# Patient Record
Sex: FEMALE | Race: BLACK OR AFRICAN AMERICAN | NOT HISPANIC OR LATINO | Employment: FULL TIME | URBAN - METROPOLITAN AREA
[De-identification: names, ages, dates, MRNs, and addresses within clinical notes are randomized per-mention and may not be internally consistent; named-entity substitution may affect disease eponyms.]

---

## 2017-01-04 ENCOUNTER — TELEPHONE (OUTPATIENT)
Dept: RADIOLOGY | Facility: HOSPITAL | Age: 27
End: 2017-01-04

## 2017-01-12 ENCOUNTER — TELEPHONE (OUTPATIENT)
Dept: RADIOLOGY | Facility: HOSPITAL | Age: 27
End: 2017-01-12

## 2017-08-29 ENCOUNTER — HOSPITAL ENCOUNTER (EMERGENCY)
Facility: HOSPITAL | Age: 27
Discharge: HOME | End: 2017-08-29
Attending: EMERGENCY MEDICINE
Payer: COMMERCIAL

## 2017-08-29 VITALS
BODY MASS INDEX: 25.51 KG/M2 | RESPIRATION RATE: 18 BRPM | WEIGHT: 144 LBS | DIASTOLIC BLOOD PRESSURE: 69 MMHG | OXYGEN SATURATION: 100 % | SYSTOLIC BLOOD PRESSURE: 118 MMHG | TEMPERATURE: 99 F | HEART RATE: 76 BPM

## 2017-08-29 DIAGNOSIS — K08.89 PAIN, DENTAL: Primary | ICD-10-CM

## 2017-08-29 PROCEDURE — 99283 EMERGENCY DEPT VISIT LOW MDM: CPT

## 2017-08-29 PROCEDURE — 25000003 PHARM REV CODE 250: Performed by: EMERGENCY MEDICINE

## 2017-08-29 RX ORDER — FOLIC ACID 1 MG/1
1 TABLET ORAL DAILY
COMMUNITY
End: 2019-06-19

## 2017-08-29 RX ORDER — KETOROLAC TROMETHAMINE 10 MG/1
10 TABLET, FILM COATED ORAL EVERY 6 HOURS
Qty: 20 TABLET | Refills: 0 | Status: SHIPPED | OUTPATIENT
Start: 2017-08-29 | End: 2019-06-19

## 2017-08-29 RX ORDER — KETOROLAC TROMETHAMINE 10 MG/1
10 TABLET, FILM COATED ORAL
Status: COMPLETED | OUTPATIENT
Start: 2017-08-29 | End: 2017-08-29

## 2017-08-29 RX ADMIN — KETOROLAC TROMETHAMINE 10 MG: 10 TABLET, FILM COATED ORAL at 11:08

## 2017-08-30 RX ORDER — NALOXONE HYDROCHLORIDE 1 MG/ML
INJECTION INTRAMUSCULAR; INTRAVENOUS; SUBCUTANEOUS
Status: DISCONTINUED
Start: 2017-08-30 | End: 2017-08-30 | Stop reason: WASHOUT

## 2017-08-30 NOTE — ED PROVIDER NOTES
Encounter Date: 2017       History     Chief Complaint   Patient presents with    Dental Pain     reports left sided jaw/dental pain s/p filling today; minimal relief with tylenol otc     Patient presents with a chief complaint of dental pain.  Onset reported as this afternoon following a dental procedure (cavity drilled and filled).  This is localized to the upper LEFT jaw.  Patient denies trismus.  Patient denies fever and swelling.              Review of patient's allergies indicates:   Allergen Reactions    Aspirin Rash    Penicillins Rash     History reviewed. No pertinent past medical history.  Past Surgical History:   Procedure Laterality Date     SECTION       History reviewed. No pertinent family history.  Social History   Substance Use Topics    Smoking status: Never Smoker    Smokeless tobacco: Never Used    Alcohol use No     Review of Systems   Constitutional: Negative for chills and fever.   HENT: Positive for dental problem. Negative for congestion and rhinorrhea.    Respiratory: Negative for cough, chest tightness, shortness of breath and wheezing.    Cardiovascular: Negative for chest pain, palpitations and leg swelling.   Gastrointestinal: Negative for abdominal pain, constipation, diarrhea, nausea and vomiting.   Genitourinary: Negative for dysuria, frequency, urgency, vaginal bleeding and vaginal discharge.   Skin: Negative for color change and rash.   Allergic/Immunologic: Negative for immunocompromised state.   Neurological: Negative for dizziness, weakness and numbness.   Hematological: Negative for adenopathy. Does not bruise/bleed easily.   All other systems reviewed and are negative.      Physical Exam     Initial Vitals [17 2246]   BP Pulse Resp Temp SpO2   118/69 76 18 99.4 °F (37.4 °C) 100 %      MAP       85.33         Physical Exam    Nursing note and vitals reviewed.  Constitutional: She appears well-developed and well-nourished. She is not diaphoretic. No  distress.   HENT:   Head: Normocephalic and atraumatic.   Mouth/Throat: Uvula is midline, oropharynx is clear and moist and mucous membranes are normal.       Cardiovascular: Normal rate, regular rhythm, normal heart sounds and intact distal pulses.   Pulmonary/Chest: Breath sounds normal. No respiratory distress.   Neurological: She is alert and oriented to person, place, and time.   Skin: Skin is warm and dry.         ED Course   Procedures  Labs Reviewed - No data to display          Medical Decision Making:   ED Management:  All findings were reviewed with the patient/family in detail.  All remaining questions and concerns were addressed at that time.  Patient has been counseled regarding the need for follow-up as well as the indication to return to the emergency room should new or worrisome developments occur.  Ollie Smith MD  12:53 AM                     ED Course     Clinical Impression:   The encounter diagnosis was Pain, dental.                           Ollie Smith MD  08/30/17 0053

## 2018-11-18 ENCOUNTER — HOSPITAL ENCOUNTER (EMERGENCY)
Facility: HOSPITAL | Age: 28
Discharge: HOME | End: 2018-11-18
Attending: EMERGENCY MEDICINE
Payer: COMMERCIAL

## 2018-11-18 VITALS
SYSTOLIC BLOOD PRESSURE: 100 MMHG | OXYGEN SATURATION: 99 % | DIASTOLIC BLOOD PRESSURE: 58 MMHG | TEMPERATURE: 99 F | HEIGHT: 63 IN | WEIGHT: 154.31 LBS | BODY MASS INDEX: 27.34 KG/M2 | RESPIRATION RATE: 20 BRPM | HEART RATE: 85 BPM

## 2018-11-18 DIAGNOSIS — R10.9 FLANK PAIN: Primary | ICD-10-CM

## 2018-11-18 LAB
ALBUMIN SERPL BCP-MCNC: 3.5 G/DL
ALP SERPL-CCNC: 56 U/L
ALT SERPL W/O P-5'-P-CCNC: 29 U/L
ANION GAP SERPL CALC-SCNC: 9 MMOL/L
AST SERPL-CCNC: 18 U/L
B-HCG UR QL: NEGATIVE
BASOPHILS # BLD AUTO: 0.04 K/UL
BASOPHILS NFR BLD: 0.5 %
BILIRUB SERPL-MCNC: 0.4 MG/DL
BILIRUB UR QL STRIP: NEGATIVE
BUN SERPL-MCNC: 14 MG/DL
CALCIUM SERPL-MCNC: 8.8 MG/DL
CHLORIDE SERPL-SCNC: 108 MMOL/L
CLARITY UR REFRACT.AUTO: CLEAR
CO2 SERPL-SCNC: 22 MMOL/L
COLOR UR AUTO: YELLOW
CREAT SERPL-MCNC: 0.7 MG/DL
DIFFERENTIAL METHOD: NORMAL
EOSINOPHIL # BLD AUTO: 0.1 K/UL
EOSINOPHIL NFR BLD: 1.8 %
ERYTHROCYTE [DISTWIDTH] IN BLOOD BY AUTOMATED COUNT: 13.9 %
EST. GFR  (AFRICAN AMERICAN): >60 ML/MIN/1.73 M^2
EST. GFR  (NON AFRICAN AMERICAN): >60 ML/MIN/1.73 M^2
GLUCOSE SERPL-MCNC: 82 MG/DL
GLUCOSE UR QL STRIP: NEGATIVE
HCT VFR BLD AUTO: 38.3 %
HGB BLD-MCNC: 12.6 G/DL
HGB UR QL STRIP: NEGATIVE
KETONES UR QL STRIP: NEGATIVE
LEUKOCYTE ESTERASE UR QL STRIP: NEGATIVE
LYMPHOCYTES # BLD AUTO: 2.4 K/UL
LYMPHOCYTES NFR BLD: 31 %
MCH RBC QN AUTO: 30.1 PG
MCHC RBC AUTO-ENTMCNC: 32.9 G/DL
MCV RBC AUTO: 91 FL
MONOCYTES # BLD AUTO: 0.6 K/UL
MONOCYTES NFR BLD: 7.2 %
NEUTROPHILS # BLD AUTO: 4.5 K/UL
NEUTROPHILS NFR BLD: 59.4 %
NITRITE UR QL STRIP: NEGATIVE
PH UR STRIP: 7 [PH] (ref 5–8)
PLATELET # BLD AUTO: 201 K/UL
PMV BLD AUTO: 12.4 FL
POTASSIUM SERPL-SCNC: 3.8 MMOL/L
PROT SERPL-MCNC: 7 G/DL
PROT UR QL STRIP: NEGATIVE
RBC # BLD AUTO: 4.19 M/UL
SODIUM SERPL-SCNC: 139 MMOL/L
SP GR UR STRIP: 1.01 (ref 1–1.03)
URN SPEC COLLECT METH UR: NORMAL
UROBILINOGEN UR STRIP-ACNC: NEGATIVE EU/DL
WBC # BLD AUTO: 7.61 K/UL

## 2018-11-18 PROCEDURE — 81003 URINALYSIS AUTO W/O SCOPE: CPT

## 2018-11-18 PROCEDURE — 80053 COMPREHEN METABOLIC PANEL: CPT

## 2018-11-18 PROCEDURE — 85025 COMPLETE CBC W/AUTO DIFF WBC: CPT

## 2018-11-18 PROCEDURE — 81025 URINE PREGNANCY TEST: CPT

## 2018-11-18 PROCEDURE — 99284 EMERGENCY DEPT VISIT MOD MDM: CPT | Mod: 25

## 2018-11-18 PROCEDURE — 36000 PLACE NEEDLE IN VEIN: CPT

## 2018-11-18 NOTE — ED PROVIDER NOTES
Encounter Date: 2018       History     Chief Complaint   Patient presents with    Abdominal Pain     right lower quad abd pain since fri but at approx. 2am constant pain.      The history is provided by the patient.   Abdominal Pain   The current episode started two days ago. The onset of the illness was gradual. The problem has not changed since onset.The abdominal pain is located in the right flank. The abdominal pain does not radiate. The abdominal pain is relieved by nothing. The other symptoms of the illness do not include fever, fatigue, shortness of breath, nausea, vomiting, dysuria, vaginal discharge or vaginal bleeding.   Symptoms associated with the illness do not include back pain.     Review of patient's allergies indicates:   Allergen Reactions    Ibuprofen      Itching    Aspirin Rash    Penicillins Rash     Past Medical History:   Diagnosis Date    Ovarian cyst      Past Surgical History:   Procedure Laterality Date     SECTION       History reviewed. No pertinent family history.  Social History     Tobacco Use    Smoking status: Never Smoker    Smokeless tobacco: Never Used   Substance Use Topics    Alcohol use: No    Drug use: No     Review of Systems   Constitutional: Negative for fatigue and fever.   HENT: Negative for sore throat.    Respiratory: Negative for shortness of breath.    Cardiovascular: Negative for chest pain.   Gastrointestinal: Positive for abdominal pain. Negative for nausea and vomiting.   Genitourinary: Negative for dysuria, vaginal bleeding and vaginal discharge.   Musculoskeletal: Negative for back pain.   Skin: Negative for rash.   Neurological: Negative for weakness.   Hematological: Does not bruise/bleed easily.       Physical Exam     Initial Vitals [18 1001]   BP Pulse Resp Temp SpO2   106/72 84 20 98.9 °F (37.2 °C) 99 %      MAP       --         Physical Exam    Nursing note and vitals reviewed.  Constitutional: She appears well-developed  and well-nourished. No distress.   HENT:   Head: Normocephalic and atraumatic.   Mouth/Throat: Oropharynx is clear and moist.   Eyes: Conjunctivae and EOM are normal. Pupils are equal, round, and reactive to light.   Neck: Normal range of motion. Neck supple.   Cardiovascular: Normal rate, regular rhythm and normal heart sounds. Exam reveals no gallop and no friction rub.    No murmur heard.  Pulmonary/Chest: Breath sounds normal. No respiratory distress. She has no wheezes. She has no rhonchi. She has no rales.   Abdominal: Soft. Bowel sounds are normal. She exhibits no distension and no mass. There is no tenderness. There is no rebound and no guarding.   Musculoskeletal: Normal range of motion. She exhibits no edema or tenderness.   Neurological: She is alert and oriented to person, place, and time. She has normal strength.   Skin: Skin is warm and dry. No rash noted.   Psychiatric: She has a normal mood and affect. Thought content normal.         ED Course   Procedures  Labs Reviewed   COMPREHENSIVE METABOLIC PANEL - Abnormal; Notable for the following components:       Result Value    CO2 22 (*)     All other components within normal limits   CBC W/ AUTO DIFFERENTIAL   URINALYSIS, REFLEX TO URINE CULTURE    Narrative:     Preferred Collection Type->Urine, Clean Catch   PREGNANCY TEST, URINE RAPID          Imaging Results          CT Renal Stone Study ABD Pelvis WO (Final result)  Result time 11/18/18 10:48:09    Final result by Jp Corona MD (11/18/18 10:48:09)                 Impression:      Normal unenhanced CT of the abdomen and pelvis.    All CT scans at this facility are performed  using dose modulation techniques as appropriate to performed exam including the following:  automated exposure control; adjustment of mA and/or kV according to the patients size (this includes techniques or standardized protocols for targeted exams where dose is matched to indication/reason for exam: i.e. extremities or  "head);  iterative reconstruction technique.      Electronically signed by: Jp Corona MD  Date:    11/18/2018  Time:    10:48             Narrative:    EXAMINATION:  CT RENAL STONE STUDY ABD PELVIS WO    CLINICAL HISTORY:  Flank pain, stone disease suspected;    TECHNIQUE:  Low dose axial images, sagittal and coronal reformations were obtained from the lung bases to the pubic symphysis.  Contrast was not administered.    COMPARISON:  None    FINDINGS:  The lung bases are clear.  The osseous structures are unremarkable.    There is no urolithiasis or hydronephrosis.  Normal appendix.  There is no free fluid or free air or lymphadenopathy or inflammatory change.  The urinary bladder, uterus, and adnexa have a normal unenhanced appearance.  The liver, spleen, pancreas, kidneys, and adrenal glands have a normal unenhanced appearance.  No bowel obstruction.                                     ED Vital Signs:  Vitals:    11/18/18 1001   BP: 106/72   Pulse: 84   Resp: 20   Temp: 98.9 °F (37.2 °C)   TempSrc: Oral   SpO2: 99%   Weight: 70 kg (154 lb 5.2 oz)   Height: 5' 3" (1.6 m)         Abnormal Lab Results:  Labs Reviewed   COMPREHENSIVE METABOLIC PANEL - Abnormal; Notable for the following components:       Result Value    CO2 22 (*)     All other components within normal limits   CBC W/ AUTO DIFFERENTIAL   URINALYSIS, REFLEX TO URINE CULTURE    Narrative:     Preferred Collection Type->Urine, Clean Catch   PREGNANCY TEST, URINE RAPID          All Lab Results:  Results for orders placed or performed during the hospital encounter of 11/18/18   CBC auto differential   Result Value Ref Range    WBC 7.61 3.90 - 12.70 K/uL    RBC 4.19 4.00 - 5.40 M/uL    Hemoglobin 12.6 12.0 - 16.0 g/dL    Hematocrit 38.3 37.0 - 48.5 %    MCV 91 82 - 98 fL    MCH 30.1 27.0 - 31.0 pg    MCHC 32.9 32.0 - 36.0 g/dL    RDW 13.9 11.5 - 14.5 %    Platelets 201 150 - 350 K/uL    MPV 12.4 9.2 - 12.9 fL    Gran # (ANC) 4.5 1.8 - 7.7 K/uL    Lymph # " 2.4 1.0 - 4.8 K/uL    Mono # 0.6 0.3 - 1.0 K/uL    Eos # 0.1 0.0 - 0.5 K/uL    Baso # 0.04 0.00 - 0.20 K/uL    Gran% 59.4 38.0 - 73.0 %    Lymph% 31.0 18.0 - 48.0 %    Mono% 7.2 4.0 - 15.0 %    Eosinophil% 1.8 0.0 - 8.0 %    Basophil% 0.5 0.0 - 1.9 %    Differential Method Automated    Comprehensive metabolic panel   Result Value Ref Range    Sodium 139 136 - 145 mmol/L    Potassium 3.8 3.5 - 5.1 mmol/L    Chloride 108 95 - 110 mmol/L    CO2 22 (L) 23 - 29 mmol/L    Glucose 82 70 - 110 mg/dL    BUN, Bld 14 6 - 20 mg/dL    Creatinine 0.7 0.5 - 1.4 mg/dL    Calcium 8.8 8.7 - 10.5 mg/dL    Total Protein 7.0 6.0 - 8.4 g/dL    Albumin 3.5 3.5 - 5.2 g/dL    Total Bilirubin 0.4 0.1 - 1.0 mg/dL    Alkaline Phosphatase 56 55 - 135 U/L    AST 18 10 - 40 U/L    ALT 29 10 - 44 U/L    Anion Gap 9 8 - 16 mmol/L    eGFR if African American >60.0 >60 mL/min/1.73 m^2    eGFR if non African American >60.0 >60 mL/min/1.73 m^2   Urinalysis, Reflex to Urine Culture Urine, Clean Catch   Result Value Ref Range    Specimen UA Urine, Clean Catch     Color, UA Yellow Yellow, Straw, [place]    Appearance, UA Clear Clear    pH, UA 7.0 5.0 - 8.0    Specific Gravity, UA 1.015 1.005 - 1.030    Protein, UA Negative Negative    Glucose, UA Negative Negative    Ketones, UA Negative Negative    Bilirubin (UA) Negative Negative    Occult Blood UA Negative Negative    Nitrite, UA Negative Negative    Urobilinogen, UA Negative <2.0 EU/dL    Leukocytes, UA Negative Negative   Pregnancy, urine rapid   Result Value Ref Range    Preg Test, Ur Negative            Imaging Results:  Imaging Results          CT Renal Stone Study ABD Pelvis WO (Final result)  Result time 11/18/18 10:48:09    Final result by Jp Corona MD (11/18/18 10:48:09)                 Impression:      Normal unenhanced CT of the abdomen and pelvis.    All CT scans at this facility are performed  using dose modulation techniques as appropriate to performed exam including the following:   automated exposure control; adjustment of mA and/or kV according to the patients size (this includes techniques or standardized protocols for targeted exams where dose is matched to indication/reason for exam: i.e. extremities or head);  iterative reconstruction technique.      Electronically signed by: Jp Corona MD  Date:    11/18/2018  Time:    10:48             Narrative:    EXAMINATION:  CT RENAL STONE STUDY ABD PELVIS WO    CLINICAL HISTORY:  Flank pain, stone disease suspected;    TECHNIQUE:  Low dose axial images, sagittal and coronal reformations were obtained from the lung bases to the pubic symphysis.  Contrast was not administered.    COMPARISON:  None    FINDINGS:  The lung bases are clear.  The osseous structures are unremarkable.    There is no urolithiasis or hydronephrosis.  Normal appendix.  There is no free fluid or free air or lymphadenopathy or inflammatory change.  The urinary bladder, uterus, and adnexa have a normal unenhanced appearance.  The liver, spleen, pancreas, kidneys, and adrenal glands have a normal unenhanced appearance.  No bowel obstruction.                                   The Emergency Provider reviewed the vital signs and test results, which are outlined above.    ED Discussions:  11:00 AM: Reassessed pt at this time.  Pt states her condition has improved at this time. Discussed with pt all pertinent ED information and results. Discussed pt dx of flank pain and plan of tx. Gave pt all f/u and return to the ED instructions. All questions and concerns were addressed at this time. Pt expresses understanding of information and instructions, and is comfortable with plan to discharge. Pt is stable for discharge.                              Clinical Impression:       ICD-10-CM ICD-9-CM   1. Flank pain R10.9 789.09           Disposition:   Disposition: Discharged  Condition: Stable                        Tone Maki MD  11/18/18 1109

## 2019-04-21 ENCOUNTER — HOSPITAL ENCOUNTER (EMERGENCY)
Facility: HOSPITAL | Age: 29
Discharge: LEFT BEFORE DISCHARGE | End: 2019-04-22
Attending: FAMILY MEDICINE
Payer: COMMERCIAL

## 2019-04-21 DIAGNOSIS — R10.2 PELVIC PAIN DURING PREGNANCY: Primary | ICD-10-CM

## 2019-04-21 DIAGNOSIS — O26.899 PELVIC PAIN DURING PREGNANCY: Primary | ICD-10-CM

## 2019-04-21 LAB
ALBUMIN SERPL BCP-MCNC: 3.5 G/DL (ref 3.5–5.2)
ALP SERPL-CCNC: 58 U/L (ref 55–135)
ALT SERPL W/O P-5'-P-CCNC: 19 U/L (ref 10–44)
ANION GAP SERPL CALC-SCNC: 10 MMOL/L (ref 8–16)
AST SERPL-CCNC: 19 U/L (ref 10–40)
B-HCG UR QL: POSITIVE
BASOPHILS # BLD AUTO: 0.03 K/UL (ref 0–0.2)
BASOPHILS NFR BLD: 0.2 % (ref 0–1.9)
BILIRUB SERPL-MCNC: 0.3 MG/DL (ref 0.1–1)
BILIRUB UR QL STRIP: NEGATIVE
BUN SERPL-MCNC: 14 MG/DL (ref 6–20)
CALCIUM SERPL-MCNC: 8.9 MG/DL (ref 8.7–10.5)
CHLORIDE SERPL-SCNC: 108 MMOL/L (ref 95–110)
CLARITY UR REFRACT.AUTO: CLEAR
CO2 SERPL-SCNC: 21 MMOL/L (ref 23–29)
COLOR UR AUTO: YELLOW
CREAT SERPL-MCNC: 0.7 MG/DL (ref 0.5–1.4)
DIFFERENTIAL METHOD: ABNORMAL
EOSINOPHIL # BLD AUTO: 0.1 K/UL (ref 0–0.5)
EOSINOPHIL NFR BLD: 0.7 % (ref 0–8)
ERYTHROCYTE [DISTWIDTH] IN BLOOD BY AUTOMATED COUNT: 13.9 % (ref 11.5–14.5)
EST. GFR  (AFRICAN AMERICAN): >60 ML/MIN/1.73 M^2
EST. GFR  (NON AFRICAN AMERICAN): >60 ML/MIN/1.73 M^2
GLUCOSE SERPL-MCNC: 90 MG/DL (ref 70–110)
GLUCOSE UR QL STRIP: NEGATIVE
HCT VFR BLD AUTO: 36.4 % (ref 37–48.5)
HGB BLD-MCNC: 12 G/DL (ref 12–16)
HGB UR QL STRIP: NEGATIVE
KETONES UR QL STRIP: NEGATIVE
LEUKOCYTE ESTERASE UR QL STRIP: NEGATIVE
LIPASE SERPL-CCNC: 137 U/L (ref 4–60)
LYMPHOCYTES # BLD AUTO: 1.5 K/UL (ref 1–4.8)
LYMPHOCYTES NFR BLD: 12.3 % (ref 18–48)
MCH RBC QN AUTO: 30.7 PG (ref 27–31)
MCHC RBC AUTO-ENTMCNC: 33 G/DL (ref 32–36)
MCV RBC AUTO: 93 FL (ref 82–98)
MONOCYTES # BLD AUTO: 1 K/UL (ref 0.3–1)
MONOCYTES NFR BLD: 7.8 % (ref 4–15)
NEUTROPHILS # BLD AUTO: 9.6 K/UL (ref 1.8–7.7)
NEUTROPHILS NFR BLD: 78.7 % (ref 38–73)
NITRITE UR QL STRIP: NEGATIVE
PH UR STRIP: 6 [PH] (ref 5–8)
PLATELET # BLD AUTO: 212 K/UL (ref 150–350)
PMV BLD AUTO: 12.3 FL (ref 9.2–12.9)
POTASSIUM SERPL-SCNC: 3.9 MMOL/L (ref 3.5–5.1)
PROT SERPL-MCNC: 7 G/DL (ref 6–8.4)
PROT UR QL STRIP: NEGATIVE
RBC # BLD AUTO: 3.91 M/UL (ref 4–5.4)
SODIUM SERPL-SCNC: 139 MMOL/L (ref 136–145)
SP GR UR STRIP: 1.02 (ref 1–1.03)
URN SPEC COLLECT METH UR: NORMAL
UROBILINOGEN UR STRIP-ACNC: NEGATIVE EU/DL
WBC # BLD AUTO: 12.14 K/UL (ref 3.9–12.7)

## 2019-04-21 PROCEDURE — 96361 HYDRATE IV INFUSION ADD-ON: CPT | Mod: ER

## 2019-04-21 PROCEDURE — 80053 COMPREHEN METABOLIC PANEL: CPT | Mod: ER

## 2019-04-21 PROCEDURE — 83690 ASSAY OF LIPASE: CPT | Mod: ER

## 2019-04-21 PROCEDURE — 25000003 PHARM REV CODE 250: Mod: ER | Performed by: FAMILY MEDICINE

## 2019-04-21 PROCEDURE — 93005 ELECTROCARDIOGRAM TRACING: CPT | Mod: ER

## 2019-04-21 PROCEDURE — 81025 URINE PREGNANCY TEST: CPT | Mod: ER

## 2019-04-21 PROCEDURE — 81003 URINALYSIS AUTO W/O SCOPE: CPT | Mod: ER

## 2019-04-21 PROCEDURE — 99284 EMERGENCY DEPT VISIT MOD MDM: CPT | Mod: ER

## 2019-04-21 PROCEDURE — 96360 HYDRATION IV INFUSION INIT: CPT | Mod: ER

## 2019-04-21 PROCEDURE — 93010 EKG 12-LEAD: ICD-10-PCS | Mod: ,,, | Performed by: NUCLEAR MEDICINE

## 2019-04-21 PROCEDURE — 93010 ELECTROCARDIOGRAM REPORT: CPT | Mod: ,,, | Performed by: NUCLEAR MEDICINE

## 2019-04-21 PROCEDURE — 85025 COMPLETE CBC W/AUTO DIFF WBC: CPT | Mod: ER

## 2019-04-21 RX ADMIN — SODIUM CHLORIDE 1000 ML: 0.9 INJECTION, SOLUTION INTRAVENOUS at 06:04

## 2019-04-21 NOTE — ED NOTES
Pt to ED reporting lower abd pain along with nausea and 1 episode of vomiting that began approx x2 days ago. Pt reports that her menstrual cycle was supposed to begin on 04/15/19 but has never started.

## 2019-04-22 VITALS
WEIGHT: 157.63 LBS | HEART RATE: 99 BPM | OXYGEN SATURATION: 98 % | DIASTOLIC BLOOD PRESSURE: 59 MMHG | TEMPERATURE: 99 F | BODY MASS INDEX: 27.93 KG/M2 | RESPIRATION RATE: 19 BRPM | SYSTOLIC BLOOD PRESSURE: 100 MMHG | HEIGHT: 63 IN

## 2019-04-22 NOTE — ED NOTES
PT requesting to sign out AMA and not willing to continue waiting for EMS. Advised pt of risks and potential death of ectopic pregnancy. MD notified.

## 2019-04-22 NOTE — ED PROVIDER NOTES
Encounter Date: 2019       History     Chief Complaint   Patient presents with    Abdominal Pain     left side pain for 1 week. getting worse. hx ovarian cyst     This is a 28-year-old Afro-American female with history of ovarian cyst who presents emergency department with 1 week of progressively worsening left-sided abdominal pain. Patient states that she has been taking Tylenol around the clock without any relief.  States that the cramping sensation.  Last menstrual cycle was  and she is on birth control.  States that he is using birth control and says she active with 1 partner denies any vaginal discharge. Denies any nausea vomiting or diarrhea.  Last bowel movement was yesterday and normal. States that she has had similar problems to this before.  Last ultrasound showed she had a very insist that this past December.  States it feels similar to it.  Reports chills but no fever.  Denies dysuria        Review of patient's allergies indicates:   Allergen Reactions    Ibuprofen      Itching    Aspirin Rash    Penicillins Rash     Past Medical History:   Diagnosis Date    Ovarian cyst      Past Surgical History:   Procedure Laterality Date     SECTION       No family history on file.  Social History     Tobacco Use    Smoking status: Never Smoker    Smokeless tobacco: Never Used   Substance Use Topics    Alcohol use: No    Drug use: No     Review of Systems   Constitutional: Positive for chills. Negative for diaphoresis and fever.   HENT: Negative for congestion, postnasal drip, rhinorrhea, sneezing and sore throat.    Eyes: Negative for visual disturbance.   Respiratory: Negative for cough, chest tightness and shortness of breath.    Cardiovascular: Negative for chest pain, palpitations and leg swelling.   Gastrointestinal: Positive for abdominal pain. Negative for constipation, diarrhea, nausea and vomiting.   Genitourinary: Negative for dysuria, frequency, hematuria, urgency,  vaginal discharge and vaginal pain.   Musculoskeletal: Negative for back pain, gait problem and myalgias.   Skin: Negative for rash.   Neurological: Negative for weakness, light-headedness, numbness and headaches.   Hematological: Does not bruise/bleed easily.   All other systems reviewed and are negative.      Physical Exam     Initial Vitals [04/21/19 1819]   BP Pulse Resp Temp SpO2   124/71 109 20 98.6 °F (37 °C) 100 %      MAP       --         Vitals:    04/21/19 2201 04/21/19 2231 04/21/19 2301 04/21/19 2328   BP: 104/66 100/79 (!) 82/47 104/63   Pulse: 97 97 95 96   Resp: 17 17 20 11   Temp:       TempSrc:       SpO2: 99% 99% 99% 96%   Weight:       Height:        04/22/19 0001   BP: (!) 100/59   Pulse: 99   Resp: 19   Temp:    TempSrc:    SpO2: 98%   Weight:    Height:        Physical Exam    Nursing note and vitals reviewed.  Constitutional: She appears well-developed and well-nourished. She is not diaphoretic. No distress.   HENT:   Head: Normocephalic.   Right Ear: External ear normal.   Left Ear: External ear normal.   Mouth/Throat: Oropharynx is clear and moist.   Eyes: Conjunctivae and EOM are normal. Pupils are equal, round, and reactive to light.   Neck: Normal range of motion. Neck supple. No JVD present.   Cardiovascular: Normal rate, regular rhythm and normal heart sounds.   Pulmonary/Chest: Breath sounds normal. No respiratory distress. She has no wheezes.   Abdominal: Soft. Bowel sounds are normal. She exhibits no distension. There is no tenderness. There is no rebound and no guarding.   Mild tenderness palpation look in the left lower quadrant and left to the umbilicus   Genitourinary:   Genitourinary Comments: Deferred due to refusal   Musculoskeletal: Normal range of motion. She exhibits no edema or tenderness.   Neurological: She is alert and oriented to person, place, and time. She has normal strength. No cranial nerve deficit or sensory deficit.   Skin: Skin is warm and dry. Capillary  refill takes less than 2 seconds. No rash noted.   Psychiatric: She has a normal mood and affect. Thought content normal.         ED Course   Procedures  Labs Reviewed   CBC W/ AUTO DIFFERENTIAL - Abnormal; Notable for the following components:       Result Value    RBC 3.91 (*)     Hematocrit 36.4 (*)     Gran # (ANC) 9.6 (*)     Gran% 78.7 (*)     Lymph% 12.3 (*)     All other components within normal limits   COMPREHENSIVE METABOLIC PANEL - Abnormal; Notable for the following components:    CO2 21 (*)     All other components within normal limits   LIPASE - Abnormal; Notable for the following components:    Lipase 137 (*)     All other components within normal limits   PREGNANCY TEST, URINE RAPID - Abnormal; Notable for the following components:    Preg Test, Ur Positive (*)     All other components within normal limits   URINALYSIS, REFLEX TO URINE CULTURE    Narrative:     Preferred Collection Type->Urine, Clean Catch          Imaging Results    None          Medical Decision Making:   ED Management:  Regarding POSITIVE PREGNANCY TEST, patient was educated on antepartum period including:  normal pregnancy lasts about 40 weeks; first trimester lasts from the last period through the 12th week of pregnancy; the second trimester lasts from the 13th week of pregnancy through the 23rd week; and the third trimester lasts from the 24th week of pregnancy until delivery.  Advised patient to seek care immediately if she:  develops a severe headache that does not go away; has new or increased vision changes (i.e.,  blurred or spotted vision);  has new or increased swelling in the face or hands; has pain or cramping in abdomen or lower back; or has vaginal bleeding.  Recommended that patient contact primary care provider or OB/GYN if she: has abdominal cramps, pressure, or tightening; has a change in vaginal discharge; cannot keep food or drinks down, and begin losing weight; develop chills or a fever; experience vaginal  "itching, burning, or pain; notice yellow, green, white, or foul-smelling vaginal discharge; has pain or burning with urination, less urine than usual, or pink or bloody urine; or has any questions or concerns about  pregnancy.  Patient was provided tips to staying healthy during pregnancy including: eating a variety of healthy foods (i.e., including fruits, vegetables, whole-grain breads, low-fat dairy foods, beans, lean meats, and fish low in mercury); drinking adequate amounts of liquids as directed; taking prenatal vitamins as directed; gaining appropriate amount of weight that is considered healthy for both mom and fetus; participates in moderate exercise; quits or refrains from smoking; avoids alcohol use; and talks to healthcare provider or pharmacist before  taking  any medicines.  Patient was advised to avoid hot tubs and saunas, toxoplasmosis (raw meat and infected cat feces), and traveling during the 3rd trimester. Reiterated to the patient the importance of receiving prenatal care and being compliant with all medications and treatment plans recommended by OB/GYN.     Portions of this note may have been created with voice recognition software. Occasional "wrong-word" or "sound-a-like" substitutions may have occurred due to the inherent limitations of voice recognition software. Please, read the note carefully and recognize, using context, where substitutions have occurred.                        ED Course as of Apr 23 0608   Sun Apr 21, 2019   1847 EKGInterpretation:  Heart rate 104, sinus tachycardia, no STEMI    [SAMUEL]   1914 discussed results with patient and recommend that she be transferred to get a pelvic ultrasound to rule out ectopic pregnancy.  Patient verbalized understanding and is in agreement.    [SAMUEL]   1917 Discussed case with Dr. Horner, at [facility] who agrees and accepts transfer.    [SAMUEL]   1924 Offered to do pelvic exam to patient however she refuses this time.    [SAMUEL]   5841 " Patient states she wants to leave.  Patient will be leaving AMA.  I have discussed medical consequences such as at present did see a bleeding to death septic shock versus clinical due to radiation.  Patient verbalized understanding states that she will with the primary care physician.  Patient will sign out AMA      [SAMUEL]      ED Course User Index  [SAMUEL] Supriya Stanford MD     The patient is over the age of 18 years, exhibits no evidence of an altered level of consciousness, and possesses appropriate decision-making capacity based on their ability to understand and communicate rationally.  Patient was advised, that, for an optimal recovery, they should be responsible for complying with the individualized treatment plan provided today by the medical professionals at [facility].  This includes taking medications as directed, reviewing and understanding all discharge instructions, and scheduling any appointments that were recommended.  I informed the patient that failing to take responsibility for their health and safety and not complying with the recommendations provided to today is deemed to be against our medical advice. The patient was provided clear and verbal details regarding alternatives, benefits, risks, and complications related to their condition. In addition, we reiterated the seriousness of their condition and our recommendations for urgent follow-up care with a qualified healthcare provider capable of addressing their specific needs.  Furthermore, the patient was made aware of the serious complications that may be associated with their condition, including: stroke, permanent disability, paralysis, loss of limb(s), and death.  I have personally explained to the patient that choosing to leave against medical advise may result in permanent bodily harm or death. I again discussed at great length that without further evaluation and monitoring there may be unforeseen circumstances and/or deterioration causing  permanent bodily harm or death as a result of his/her choice. The patient verbalized these risks back to me in laymans terms.  The patient was able to discuss the treatment that was recommended and, was aware of specific risks associated with the refusal of care relating to their specific condition.  Patient was instructed to return to the emergency department if their condition changes or they wish to comply with our treatment recommendations.         Clinical Impression:       ICD-10-CM ICD-9-CM   1. Pelvic pain during pregnancy O26.899 646.80    R10.2 625.9         Disposition:   Disposition: AMA  Condition: Fair                        Supriya Stanford MD  04/21/19 1926       Supriya Stanford MD  04/23/19 0609

## 2019-05-14 LAB
C TRACH RRNA SPEC QL PROBE: NEGATIVE
CYSTIC FIBROSIS MUTATION PNL: NEGATIVE
HBV SURFACE AG SERPL QL IA: NEGATIVE
N GONORRHOEAE, AMPLIFIED DNA: NEGATIVE
RUBELLA IMMUNE STATUS: REACTIVE

## 2019-06-11 ENCOUNTER — TELEPHONE (OUTPATIENT)
Dept: OBSTETRICS AND GYNECOLOGY | Facility: CLINIC | Age: 29
End: 2019-06-11

## 2019-06-11 NOTE — TELEPHONE ENCOUNTER
Spoke to patient and scheduled her appointment for 06/19/19 at 9:15am to see Dr. Wagoner at the [place] location. Patient verbalized understanding.

## 2019-06-11 NOTE — TELEPHONE ENCOUNTER
----- Message from Lupis Hamilton sent at 6/11/2019  8:06 AM CDT -----  Contact: pt  Pt requesting a call back to schedule appointment. She is 13 weeks and was receiving prenatal care at [facility]. Please call back at [phone].      Thanks,  Lupis Hamilton

## 2019-06-19 ENCOUNTER — INITIAL PRENATAL (OUTPATIENT)
Dept: OBSTETRICS AND GYNECOLOGY | Facility: CLINIC | Age: 29
End: 2019-06-19
Payer: COMMERCIAL

## 2019-06-19 VITALS
DIASTOLIC BLOOD PRESSURE: 62 MMHG | WEIGHT: 167.56 LBS | BODY MASS INDEX: 29.68 KG/M2 | SYSTOLIC BLOOD PRESSURE: 102 MMHG

## 2019-06-19 DIAGNOSIS — Z34.82 ENCOUNTER FOR SUPERVISION OF OTHER NORMAL PREGNANCY IN SECOND TRIMESTER: Primary | ICD-10-CM

## 2019-06-19 DIAGNOSIS — D57.3 SICKLE CELL TRAIT: ICD-10-CM

## 2019-06-19 LAB
BILIRUB UR QL STRIP: NEGATIVE
CLARITY UR: CLEAR
COLOR UR: YELLOW
GLUCOSE UR QL STRIP: NEGATIVE
HGB UR QL STRIP: NEGATIVE
KETONES UR QL STRIP: NEGATIVE
LEUKOCYTE ESTERASE UR QL STRIP: NEGATIVE
NITRITE UR QL STRIP: NEGATIVE
PH UR STRIP: 8 [PH] (ref 5–8)
PROT UR QL STRIP: NEGATIVE
SP GR UR STRIP: 1.01 (ref 1–1.03)
URN SPEC COLLECT METH UR: NORMAL

## 2019-06-19 PROCEDURE — 99999 PR PBB SHADOW E&M-EST. PATIENT-LVL II: ICD-10-PCS | Mod: PBBFAC,,, | Performed by: OBSTETRICS & GYNECOLOGY

## 2019-06-19 PROCEDURE — 87086 URINE CULTURE/COLONY COUNT: CPT

## 2019-06-19 PROCEDURE — 88175 CYTOPATH C/V AUTO FLUID REDO: CPT

## 2019-06-19 PROCEDURE — 87624 HPV HI-RISK TYP POOLED RSLT: CPT

## 2019-06-19 PROCEDURE — 0502F PR SUBSEQUENT PRENATAL CARE: ICD-10-PCS | Mod: CPTII,S$GLB,, | Performed by: OBSTETRICS & GYNECOLOGY

## 2019-06-19 PROCEDURE — 81003 URINALYSIS AUTO W/O SCOPE: CPT

## 2019-06-19 PROCEDURE — 99999 PR PBB SHADOW E&M-EST. PATIENT-LVL II: CPT | Mod: PBBFAC,,, | Performed by: OBSTETRICS & GYNECOLOGY

## 2019-06-19 PROCEDURE — 0502F SUBSEQUENT PRENATAL CARE: CPT | Mod: CPTII,S$GLB,, | Performed by: OBSTETRICS & GYNECOLOGY

## 2019-06-19 RX ORDER — VALACYCLOVIR HYDROCHLORIDE 500 MG/1
500 TABLET, FILM COATED ORAL DAILY
COMMUNITY

## 2019-06-19 NOTE — PROGRESS NOTES
Transfer back from  -records reviewed and added. U/s showing 5wga GS no fetus. Will schedule u/s.   12/2018 pap was ASCUS. Pap was repeated today, cervix appeared normal.  hx c/s x2 (first one w DrG) both at term  both boys G6PD, first son with SC disease    Declines seq testing.

## 2019-06-20 ENCOUNTER — PROCEDURE VISIT (OUTPATIENT)
Dept: OBSTETRICS AND GYNECOLOGY | Facility: CLINIC | Age: 29
End: 2019-06-20
Payer: COMMERCIAL

## 2019-06-20 ENCOUNTER — TELEPHONE (OUTPATIENT)
Dept: OBSTETRICS AND GYNECOLOGY | Facility: CLINIC | Age: 29
End: 2019-06-20

## 2019-06-20 DIAGNOSIS — O26.841 UTERINE SIZE DATE DISCREPANCY PREGNANCY, FIRST TRIMESTER: ICD-10-CM

## 2019-06-20 DIAGNOSIS — Z34.82 ENCOUNTER FOR SUPERVISION OF OTHER NORMAL PREGNANCY IN SECOND TRIMESTER: ICD-10-CM

## 2019-06-20 LAB
BACTERIA UR CULT: NORMAL
BACTERIA UR CULT: NORMAL

## 2019-06-20 PROCEDURE — 76801 PR US, OB <14WKS, TRANSABD, SINGLE GESTATION: ICD-10-PCS | Mod: S$GLB,,, | Performed by: OBSTETRICS & GYNECOLOGY

## 2019-06-20 PROCEDURE — 76801 OB US < 14 WKS SINGLE FETUS: CPT | Mod: S$GLB,,, | Performed by: OBSTETRICS & GYNECOLOGY

## 2019-06-20 NOTE — TELEPHONE ENCOUNTER
Spoke with pt. Pt noticied her lmp was put in wrong on her previous visit. Her lmp was 3/13/19 not 3/22/19.    Notified pt I would let Dr. Wagoner know. Pt verbalized understanding.

## 2019-06-20 NOTE — TELEPHONE ENCOUNTER
----- Message from Anitha Lorna sent at 6/20/2019 11:58 AM CDT -----  Type:  Needs Medical Advice    Who Called: nehemiah Judge  Symptoms (please be specific):     How long has patient had these symptoms:    Pharmacy name and phone #:    Would the patient rather a call back or a response via [facility]?  Call back  Best Call Back Number:   [phone]  Additional Information: States she has questions regarding her due date//please call//ricardo/allan

## 2019-06-24 ENCOUNTER — TELEPHONE (OUTPATIENT)
Dept: OBSTETRICS AND GYNECOLOGY | Facility: CLINIC | Age: 29
End: 2019-06-24

## 2019-06-24 LAB
HPV HR 12 DNA CVX QL NAA+PROBE: NEGATIVE
HPV16 AG SPEC QL: NEGATIVE
HPV18 DNA SPEC QL NAA+PROBE: NEGATIVE

## 2019-06-24 NOTE — TELEPHONE ENCOUNTER
----- Message from Mulu Serrato sent at 6/24/2019 12:26 PM CDT -----  Contact: Patient  Type:  Needs Medical Advice    Who Called: Patient  Symptoms (please be specific): tooth ache   How long has patient had these symptoms:    Pharmacy name and phone #:    Would the patient rather a call back or a response via [facility]? call  Best Call Back Number: [phone]  Additional Information: Patient needs  A clearance for her dentist to do dental work.

## 2019-06-24 NOTE — LETTER
June 24, 2019    Nu Noel  [address] [address]              [facility]  [address] [facility] [address]  Phone: [phone]  Fax: [phone]      To Whom It May Concern:    Ms. Noel is currently under our care for pregnancy.  Estimated Date of Delivery: 12/23/19    Any elective dental work should preferably be scheduled during or after the mid-trimester or postpartum.    Dental procedures can be performed with local anesthesia without epinephrine. Recommended antibiotics include penicillins, erythromycin, and cephalosporins. Tylenol #3 or Percocet may be used for pain control. No x-rays are allowed for routine screening. For dental problems, up to four x-rays may be taken with proper abdominal shield.    Sincerely,    Nahomi Wagoner MD

## 2019-07-16 ENCOUNTER — PATIENT MESSAGE (OUTPATIENT)
Dept: OBSTETRICS AND GYNECOLOGY | Facility: CLINIC | Age: 29
End: 2019-07-16

## 2019-07-18 ENCOUNTER — LAB VISIT (OUTPATIENT)
Dept: LAB | Facility: HOSPITAL | Age: 29
End: 2019-07-18
Payer: COMMERCIAL

## 2019-07-18 ENCOUNTER — ROUTINE PRENATAL (OUTPATIENT)
Dept: OBSTETRICS AND GYNECOLOGY | Facility: CLINIC | Age: 29
End: 2019-07-18
Payer: COMMERCIAL

## 2019-07-18 VITALS
DIASTOLIC BLOOD PRESSURE: 70 MMHG | SYSTOLIC BLOOD PRESSURE: 118 MMHG | BODY MASS INDEX: 30.77 KG/M2 | WEIGHT: 173.75 LBS

## 2019-07-18 DIAGNOSIS — Z83.49 FAMILY HISTORY OF G6PD: ICD-10-CM

## 2019-07-18 DIAGNOSIS — D57.3 SICKLE CELL TRAIT: ICD-10-CM

## 2019-07-18 DIAGNOSIS — Z34.82 ENCOUNTER FOR SUPERVISION OF OTHER NORMAL PREGNANCY IN SECOND TRIMESTER: ICD-10-CM

## 2019-07-18 DIAGNOSIS — Z34.82 ENCOUNTER FOR SUPERVISION OF OTHER NORMAL PREGNANCY IN SECOND TRIMESTER: Primary | ICD-10-CM

## 2019-07-18 LAB
BASOPHILS # BLD AUTO: 0.03 K/UL (ref 0–0.2)
BASOPHILS NFR BLD: 0.2 % (ref 0–1.9)
DIFFERENTIAL METHOD: ABNORMAL
EOSINOPHIL # BLD AUTO: 0.2 K/UL (ref 0–0.5)
EOSINOPHIL NFR BLD: 1.5 % (ref 0–8)
ERYTHROCYTE [DISTWIDTH] IN BLOOD BY AUTOMATED COUNT: 13.2 % (ref 11.5–14.5)
HCT VFR BLD AUTO: 38.9 % (ref 37–48.5)
HGB BLD-MCNC: 12.4 G/DL (ref 12–16)
IMM GRANULOCYTES # BLD AUTO: 0.09 K/UL (ref 0–0.04)
IMM GRANULOCYTES NFR BLD AUTO: 0.7 % (ref 0–0.5)
LYMPHOCYTES # BLD AUTO: 2 K/UL (ref 1–4.8)
LYMPHOCYTES NFR BLD: 15.3 % (ref 18–48)
MCH RBC QN AUTO: 31.2 PG (ref 27–31)
MCHC RBC AUTO-ENTMCNC: 31.9 G/DL (ref 32–36)
MCV RBC AUTO: 98 FL (ref 82–98)
MONOCYTES # BLD AUTO: 0.7 K/UL (ref 0.3–1)
MONOCYTES NFR BLD: 5.5 % (ref 4–15)
NEUTROPHILS # BLD AUTO: 10.2 K/UL (ref 1.8–7.7)
NEUTROPHILS NFR BLD: 76.8 % (ref 38–73)
NRBC BLD-RTO: 0 /100 WBC
PLATELET # BLD AUTO: 183 K/UL (ref 150–350)
PMV BLD AUTO: 13.5 FL (ref 9.2–12.9)
RBC # BLD AUTO: 3.97 M/UL (ref 4–5.4)
WBC # BLD AUTO: 13.21 K/UL (ref 3.9–12.7)

## 2019-07-18 PROCEDURE — 86901 BLOOD TYPING SEROLOGIC RH(D): CPT

## 2019-07-18 PROCEDURE — 85025 COMPLETE CBC W/AUTO DIFF WBC: CPT

## 2019-07-18 PROCEDURE — 36415 COLL VENOUS BLD VENIPUNCTURE: CPT

## 2019-07-18 PROCEDURE — 0502F PR SUBSEQUENT PRENATAL CARE: ICD-10-PCS | Mod: CPTII,S$GLB,, | Performed by: OBSTETRICS & GYNECOLOGY

## 2019-07-18 PROCEDURE — 99999 PR PBB SHADOW E&M-EST. PATIENT-LVL II: CPT | Mod: PBBFAC,,, | Performed by: OBSTETRICS & GYNECOLOGY

## 2019-07-18 PROCEDURE — 0502F SUBSEQUENT PRENATAL CARE: CPT | Mod: CPTII,S$GLB,, | Performed by: OBSTETRICS & GYNECOLOGY

## 2019-07-18 PROCEDURE — 99999 PR PBB SHADOW E&M-EST. PATIENT-LVL II: ICD-10-PCS | Mod: PBBFAC,,, | Performed by: OBSTETRICS & GYNECOLOGY

## 2019-07-19 LAB
ABO + RH BLD: NORMAL
BLD GP AB SCN CELLS X3 SERPL QL: NORMAL

## 2019-07-20 PROBLEM — Z83.49 FAMILY HISTORY OF G6PD: Status: ACTIVE | Noted: 2019-07-20

## 2019-07-21 NOTE — PROGRESS NOTES
No complaints. decl quad scr. F/u for anatomy scan.     Patient viewed [employer] video, Learn Your Baby and was provided with [facility] handouts: Baby Led Feeding and Rooming In. Discussed benefits of rooming-in 24 hours a day, benefits of cue-based feeding, the impact of feeding frequency on milk supply, and basic breastfeeding management. Encouraged patient to attend [employer] Prenatal Breastfeeding Class and to download the [employer] mobile wilner if she has not already done so. Patient verbalizes understanding.

## 2019-08-07 ENCOUNTER — NURSE TRIAGE (OUTPATIENT)
Dept: ADMINISTRATIVE | Facility: CLINIC | Age: 29
End: 2019-08-07

## 2019-08-07 NOTE — TELEPHONE ENCOUNTER
Patient called to report the following:     -    Reason for Disposition   Message left on identified voice mail    Protocols used: NO CONTACT OR DUPLICATE CONTACT CALL-A-DC

## 2019-08-08 ENCOUNTER — PROCEDURE VISIT (OUTPATIENT)
Dept: OBSTETRICS AND GYNECOLOGY | Facility: CLINIC | Age: 29
End: 2019-08-08
Payer: COMMERCIAL

## 2019-08-08 ENCOUNTER — ROUTINE PRENATAL (OUTPATIENT)
Dept: OBSTETRICS AND GYNECOLOGY | Facility: CLINIC | Age: 29
End: 2019-08-08
Payer: COMMERCIAL

## 2019-08-08 VITALS
BODY MASS INDEX: 31.67 KG/M2 | SYSTOLIC BLOOD PRESSURE: 102 MMHG | WEIGHT: 178.81 LBS | DIASTOLIC BLOOD PRESSURE: 62 MMHG

## 2019-08-08 DIAGNOSIS — Z36.3 ANTENATAL SCREENING FOR MALFORMATION USING ULTRASONICS: ICD-10-CM

## 2019-08-08 DIAGNOSIS — Z34.82 ENCOUNTER FOR SUPERVISION OF OTHER NORMAL PREGNANCY IN SECOND TRIMESTER: ICD-10-CM

## 2019-08-08 DIAGNOSIS — Z34.90 NORMAL INTRAUTERINE PREGNANCY, ANTEPARTUM: ICD-10-CM

## 2019-08-08 DIAGNOSIS — Z98.891 HISTORY OF 2 CESAREAN SECTIONS: ICD-10-CM

## 2019-08-08 PROCEDURE — 99999 PR PBB SHADOW E&M-EST. PATIENT-LVL II: CPT | Mod: PBBFAC,,, | Performed by: ADVANCED PRACTICE MIDWIFE

## 2019-08-08 PROCEDURE — 76805 PR US, OB 14+WKS, TRANSABD, SINGLE GESTATION: ICD-10-PCS | Mod: S$GLB,,, | Performed by: OBSTETRICS & GYNECOLOGY

## 2019-08-08 PROCEDURE — 76805 OB US >/= 14 WKS SNGL FETUS: CPT | Mod: S$GLB,,, | Performed by: OBSTETRICS & GYNECOLOGY

## 2019-08-08 PROCEDURE — 0502F PR SUBSEQUENT PRENATAL CARE: ICD-10-PCS | Mod: CPTII,S$GLB,, | Performed by: ADVANCED PRACTICE MIDWIFE

## 2019-08-08 PROCEDURE — 0502F SUBSEQUENT PRENATAL CARE: CPT | Mod: CPTII,S$GLB,, | Performed by: ADVANCED PRACTICE MIDWIFE

## 2019-08-08 PROCEDURE — 99999 PR PBB SHADOW E&M-EST. PATIENT-LVL II: ICD-10-PCS | Mod: PBBFAC,,, | Performed by: ADVANCED PRACTICE MIDWIFE

## 2019-08-08 NOTE — PROGRESS NOTES
Anatomy ultrasound, vertex, posterior placenta, three-vessel cord, normal fluid, is a normal male fetal anatomy with sub opt  kidneys and cervical spine repeat next visit    Considering BTL  Plans on breast-feeding -will be 1st time, all support will be given    Patient viewed [email or web address] video, Learn Your Baby and was provided with [facility] handouts: Baby Led Feeding and Rooming In. Discussed benefits of rooming-in 24 hours a day, couraged patient to download the [employer] mobile wilner if she has not already done so. Patient verbalizes understanding.

## 2019-08-08 NOTE — PATIENT INSTRUCTIONS
Pregnancy: Your Second Trimester Changes    Each day, you and your baby are changing and growing together. Heres a quick look at whats happening to both of you.  How You Are Changing  Even when you dont notice it, your body is adapting to meet the needs of your growing baby. The changes in your body might also affect your moods.  Your body  Your uterus expands as baby grows. As the weeks go by, you will feel more pressure on your bladder, stomach, and other organs. You may notice some skin color changes on your forehead, nose, or cheeks. Freckles may darken, and moles may grow. You may notice a darker line on your abdomen between your belly button and pubic bone in the midline.  Your moods  The second trimester is often easier than the first. Still, be prepared for mood swings. These are due to the increase in hormones (chemicals that affect the way organs work) produced by your body. These mood swings are a normal part of pregnancy.  How your baby is growing       Month 4  Babys heartbeat may be heard with a Doppler (hand-held ultrasound device) by 9 to 10 weeks. Eyebrows, eyelashes and fingernails begin to form.  Month 5  You may feel your baby move. After a growth spurt, your baby nears 10 inches. Month 6  Babys fingerprints have formed. Your baby weighs about 1  to 2 pounds and is about 12 inches long.   Date Last Reviewed: 8/16/2015  © 6511-7411 [employer]. [address] [place] [address], [place] [address]. All rights reserved. This information is not intended as a substitute for professional medical care. Always follow your healthcare professional's instructions.        Adapting to Pregnancy: Second Trimester  Keep up the healthy habits you started in your first trimester. You might be a little more tired than normal. So plan your day wisely. Look at the tips below and choose the ones that suit your lifestyle.    If you have any questions, check with your healthcare provider.   If you work  If  you can, adjust your work with your employer to fit your needs. Try these tips:  · If you stand for long periods, find ways to do some tasks while sitting. Also, try to stand with 1 foot resting on a low stool or ledge. Shift your weight from foot to foot often. Wear low-heeled shoes.  · If you sit, keep your knees level with your hips. Rest your feet on a firm surface. Sit tall with support for your low back.  · If you work long hours, ask about adjusting your schedule. Try taking shorter breaks more often.  When you travel  The second trimester may be the best time for any travel. Talk to your healthcare provider about any special plans you may need to make. Always:  · Wear a seat belt. Fasten the lap part under your belly. Wear the shoulder part also.  · Take breaks often during long trips by car or plane. Move around to stretch your legs.  · Drink plenty of fluids on flights. The air in plane cabins is very dry.  · Avoid hot climates or high altitudes if you are not used to them.  · Avoid places where the food and water might make you sick.  · Make sure you are up-to-date on all immunizations, including the flu vaccine. This is especially important when traveling overseas.  Taking time to relax  Find time to rest and relax at work or at home:  · Take short time-outs daily. Do relaxation exercises.  · Breathe deeply during stressful times.  · Try not to take on too much. Plan tasks for times when you have the most energy.  · Take naps when you can. Or just sit and relax.  · After week 16, avoid lying on your back for more than a few minutes. Instead, lie on your side. Switch sides often.  Continuing as lovers  Unless your healthcare provider tells you otherwise, there is no reason to stop having sex now. Blood supply increases to the pelvic area in the second trimester. Because of this, sex might be more enjoyable. Try different positions and see whats best. Also, talk to your partner about any changes in  desire. Spotting may happen after sex. Be sure to let your healthcare provider know if there is heavy bleeding.  Keeping your environment safe  You can still clean house and use scented products. Just take some simple precautions:  · Wear gloves when using cleaning fluids.  · Open windows to let in fresh air. Use a fan if you paint.  · Avoid secondhand smoke.  · Dont breathe fumes from nail polish, hair spray, cleansers, or other chemicals.  Date Last Reviewed: 8/16/2015  © 8879-3784 [employer]. [address] [place] [address]. All rights reserved. This information is not intended as a substitute for professional medical care. Always follow your healthcare professional's instructions.

## 2019-09-12 ENCOUNTER — ROUTINE PRENATAL (OUTPATIENT)
Dept: OBSTETRICS AND GYNECOLOGY | Facility: CLINIC | Age: 29
End: 2019-09-12
Payer: COMMERCIAL

## 2019-09-12 VITALS
BODY MASS INDEX: 32.49 KG/M2 | SYSTOLIC BLOOD PRESSURE: 118 MMHG | WEIGHT: 183.44 LBS | DIASTOLIC BLOOD PRESSURE: 66 MMHG

## 2019-09-12 DIAGNOSIS — D57.3 SICKLE CELL TRAIT: ICD-10-CM

## 2019-09-12 DIAGNOSIS — Z34.82 ENCOUNTER FOR SUPERVISION OF OTHER NORMAL PREGNANCY IN SECOND TRIMESTER: Primary | ICD-10-CM

## 2019-09-12 DIAGNOSIS — Z83.49 FAMILY HISTORY OF G6PD: ICD-10-CM

## 2019-09-12 PROCEDURE — 0502F SUBSEQUENT PRENATAL CARE: CPT | Mod: CPTII,S$GLB,, | Performed by: OBSTETRICS & GYNECOLOGY

## 2019-09-12 PROCEDURE — 99999 PR PBB SHADOW E&M-EST. PATIENT-LVL II: CPT | Mod: PBBFAC,,, | Performed by: OBSTETRICS & GYNECOLOGY

## 2019-09-12 PROCEDURE — 90686 IIV4 VACC NO PRSV 0.5 ML IM: CPT | Mod: S$GLB,,, | Performed by: OBSTETRICS & GYNECOLOGY

## 2019-09-12 PROCEDURE — 99999 PR PBB SHADOW E&M-EST. PATIENT-LVL II: ICD-10-PCS | Mod: PBBFAC,,, | Performed by: OBSTETRICS & GYNECOLOGY

## 2019-09-12 PROCEDURE — 90471 FLU VACCINE (QUAD) GREATER THAN OR EQUAL TO 3YO PRESERVATIVE FREE IM: ICD-10-PCS | Mod: S$GLB,,, | Performed by: OBSTETRICS & GYNECOLOGY

## 2019-09-12 PROCEDURE — 90686 FLU VACCINE (QUAD) GREATER THAN OR EQUAL TO 3YO PRESERVATIVE FREE IM: ICD-10-PCS | Mod: S$GLB,,, | Performed by: OBSTETRICS & GYNECOLOGY

## 2019-09-12 PROCEDURE — 0502F PR SUBSEQUENT PRENATAL CARE: ICD-10-PCS | Mod: CPTII,S$GLB,, | Performed by: OBSTETRICS & GYNECOLOGY

## 2019-09-12 PROCEDURE — 90471 IMMUNIZATION ADMIN: CPT | Mod: S$GLB,,, | Performed by: OBSTETRICS & GYNECOLOGY

## 2019-09-12 NOTE — PROGRESS NOTES
No complaints.  Feels well.   No vaginal bleeding, contractions or rupture of membranes.    Desires flu vacc today.  Next visit: dtap and OSull labs. Will add urine culture due to sc trait hx.  Discussed and reviewed: fetal movements/kick counts, spontaneous rupture of membranes, pre-eclampsia precautions and  labor.  Will call to schedule c/s+BTL.  R/a/b reviewed. Consents signed.    Reviewed all forms of contraception with patient including pills, patch, ring, Depo Provera, Nexplanon, Paraguard, and Mirena. Reviewed vasectomy and tubal ligation with patient. Explained that partner vasectomy is a reliable form of contraception and is considered a safer procedure than tubal ligation. Reviewed risk of failure of BTL along with risk of ectopic pregnancy if patient conceives after tubal ligation. States she understands this risk and all questions were answered.   surgical consents signed. Pt states she does not have medicaid component.     The skin of the suprapubic region was evaluated and appears healthy and clean.  Counseled the patient to shower daily and to wash this area with an antibacterial soap such as Dial daily.  Advised her to not shave the hair from this area from now until after delivery.  I also counseled the patient to place antibacterial hand soap in all her bathrooms and kitchen at home to help facilitate proper hand hygiene practices before and after delivery.      Patient viewed [employer] video, Protect Breastfeeding and was provided with [facility] handout: Risks of Formula Feeding. Discussed importance of exclusive breastfeeding for the first 6 months and continuing to breastfeed after the introduction of complementary foods, risks of supplementation, benefits of feeding on demand, the impact of feeding frequency on milk supply, and basic management of breastfeeding. Encouraged patient to attend [employer] Prenatal Breastfeeding Class and to download the [employer] mobile wilner if she has  not already done so. Patient verbalizes understanding.

## 2019-09-12 NOTE — NURSING
Verified pt by two identifiers. Allergies and medications reviewed.   Flu vaccine given IM to left deltoid using aseptic technique. No discomfort noted, pt tolerated well. Pt advised to wait 15 min in office to monitor for any reactions. Pt verbalized understanding.

## 2019-09-26 ENCOUNTER — ROUTINE PRENATAL (OUTPATIENT)
Dept: OBSTETRICS AND GYNECOLOGY | Facility: CLINIC | Age: 29
End: 2019-09-26
Payer: COMMERCIAL

## 2019-09-26 ENCOUNTER — TELEPHONE (OUTPATIENT)
Dept: OBSTETRICS AND GYNECOLOGY | Facility: CLINIC | Age: 29
End: 2019-09-26

## 2019-09-26 ENCOUNTER — LAB VISIT (OUTPATIENT)
Dept: LAB | Facility: HOSPITAL | Age: 29
End: 2019-09-26
Attending: OBSTETRICS & GYNECOLOGY
Payer: COMMERCIAL

## 2019-09-26 VITALS — DIASTOLIC BLOOD PRESSURE: 58 MMHG | SYSTOLIC BLOOD PRESSURE: 120 MMHG | BODY MASS INDEX: 33.04 KG/M2 | WEIGHT: 186.5 LBS

## 2019-09-26 DIAGNOSIS — Z34.90 NORMAL INTRAUTERINE PREGNANCY, ANTEPARTUM: Primary | ICD-10-CM

## 2019-09-26 DIAGNOSIS — Z34.82 ENCOUNTER FOR SUPERVISION OF OTHER NORMAL PREGNANCY IN SECOND TRIMESTER: ICD-10-CM

## 2019-09-26 DIAGNOSIS — D57.3 SICKLE CELL TRAIT: ICD-10-CM

## 2019-09-26 DIAGNOSIS — Z98.891 HISTORY OF 2 CESAREAN SECTIONS: ICD-10-CM

## 2019-09-26 DIAGNOSIS — A60.9 HSV (HERPES SIMPLEX VIRUS) ANOGENITAL INFECTION: ICD-10-CM

## 2019-09-26 LAB
BASOPHILS # BLD AUTO: 0.01 K/UL (ref 0–0.2)
BASOPHILS NFR BLD: 0.1 % (ref 0–1.9)
BILIRUB UR QL STRIP: NEGATIVE
CLARITY UR: CLEAR
COLOR UR: YELLOW
DIFFERENTIAL METHOD: ABNORMAL
EOSINOPHIL # BLD AUTO: 0.1 K/UL (ref 0–0.5)
EOSINOPHIL NFR BLD: 0.5 % (ref 0–8)
ERYTHROCYTE [DISTWIDTH] IN BLOOD BY AUTOMATED COUNT: 13.2 % (ref 11.5–14.5)
GLUCOSE SERPL-MCNC: 74 MG/DL (ref 70–140)
GLUCOSE UR QL STRIP: NEGATIVE
HCT VFR BLD AUTO: 35.6 % (ref 37–48.5)
HGB BLD-MCNC: 11.6 G/DL (ref 12–16)
HGB UR QL STRIP: NEGATIVE
IMM GRANULOCYTES # BLD AUTO: 0.06 K/UL (ref 0–0.04)
IMM GRANULOCYTES NFR BLD AUTO: 0.5 % (ref 0–0.5)
KETONES UR QL STRIP: NEGATIVE
LEUKOCYTE ESTERASE UR QL STRIP: NEGATIVE
LYMPHOCYTES # BLD AUTO: 1.3 K/UL (ref 1–4.8)
LYMPHOCYTES NFR BLD: 11.3 % (ref 18–48)
MCH RBC QN AUTO: 31.3 PG (ref 27–31)
MCHC RBC AUTO-ENTMCNC: 32.6 G/DL (ref 32–36)
MCV RBC AUTO: 96 FL (ref 82–98)
MONOCYTES # BLD AUTO: 0.6 K/UL (ref 0.3–1)
MONOCYTES NFR BLD: 5.4 % (ref 4–15)
NEUTROPHILS # BLD AUTO: 9.7 K/UL (ref 1.8–7.7)
NEUTROPHILS NFR BLD: 82.2 % (ref 38–73)
NITRITE UR QL STRIP: NEGATIVE
NRBC BLD-RTO: 0 /100 WBC
PH UR STRIP: 8 [PH] (ref 5–8)
PLATELET # BLD AUTO: 173 K/UL (ref 150–350)
PMV BLD AUTO: 13.9 FL (ref 9.2–12.9)
PROT UR QL STRIP: NEGATIVE
RBC # BLD AUTO: 3.71 M/UL (ref 4–5.4)
SP GR UR STRIP: 1.01 (ref 1–1.03)
URN SPEC COLLECT METH UR: NORMAL
WBC # BLD AUTO: 11.74 K/UL (ref 3.9–12.7)

## 2019-09-26 PROCEDURE — 82950 GLUCOSE TEST: CPT

## 2019-09-26 PROCEDURE — 86592 SYPHILIS TEST NON-TREP QUAL: CPT

## 2019-09-26 PROCEDURE — 36415 COLL VENOUS BLD VENIPUNCTURE: CPT

## 2019-09-26 PROCEDURE — 86703 HIV-1/HIV-2 1 RESULT ANTBDY: CPT

## 2019-09-26 PROCEDURE — 87086 URINE CULTURE/COLONY COUNT: CPT

## 2019-09-26 PROCEDURE — 0502F SUBSEQUENT PRENATAL CARE: CPT | Mod: CPTII,S$GLB,, | Performed by: ADVANCED PRACTICE MIDWIFE

## 2019-09-26 PROCEDURE — 85025 COMPLETE CBC W/AUTO DIFF WBC: CPT

## 2019-09-26 PROCEDURE — 99999 PR PBB SHADOW E&M-EST. PATIENT-LVL III: ICD-10-PCS | Mod: PBBFAC,,, | Performed by: ADVANCED PRACTICE MIDWIFE

## 2019-09-26 PROCEDURE — 99999 PR PBB SHADOW E&M-EST. PATIENT-LVL III: CPT | Mod: PBBFAC,,, | Performed by: ADVANCED PRACTICE MIDWIFE

## 2019-09-26 PROCEDURE — 81003 URINALYSIS AUTO W/O SCOPE: CPT

## 2019-09-26 PROCEDURE — 0502F PR SUBSEQUENT PRENATAL CARE: ICD-10-PCS | Mod: CPTII,S$GLB,, | Performed by: ADVANCED PRACTICE MIDWIFE

## 2019-09-26 RX ORDER — TERCONAZOLE 4 MG/G
CREAM VAGINAL
Refills: 0 | Status: ON HOLD | COMMUNITY
Start: 2019-09-13 | End: 2019-12-08 | Stop reason: HOSPADM

## 2019-09-26 NOTE — TELEPHONE ENCOUNTER
----- Message from Laureen Del Angel PA-C sent at 2019  9:54 AM CDT -----  She is scheduled for  at noon, I have updated the sticky note.  ----- Message -----  From: Elisabeth Edmondson MA  Sent: 2019   7:52 AM CDT  To: Karlene Garcia    Good morning,    There a note on the patient's chart that she is scheduled for a  with BTL on 19 @ 11 am with Dr Wagoner but I don't see it on the surgery schedule. Is this date and time correct?    Elisabeth

## 2019-09-26 NOTE — TELEPHONE ENCOUNTER
Spoke with the patient and notified her that she is scheduled for 12/16/19 @ noon. Patient verbalized understanding, Crystal

## 2019-09-26 NOTE — PROGRESS NOTES
28 week lab today  UA and culture today  C/S with BTL confirmed for 12/16/19 at noon with Dr russo- pt aware  Sub op views FU US ordered  Tdap next visit  Defer Nourish to next visit as needs to complete 28 week lab

## 2019-09-26 NOTE — PATIENT INSTRUCTIONS

## 2019-09-27 LAB
BACTERIA UR CULT: NO GROWTH
HIV 1+2 AB+HIV1 P24 AG SERPL QL IA: NEGATIVE
RPR SER QL: NORMAL

## 2019-10-07 ENCOUNTER — HOSPITAL ENCOUNTER (OUTPATIENT)
Facility: HOSPITAL | Age: 29
LOS: 1 days | Discharge: HOME | End: 2019-10-07
Attending: OBSTETRICS & GYNECOLOGY | Admitting: OBSTETRICS & GYNECOLOGY
Payer: COMMERCIAL

## 2019-10-07 ENCOUNTER — TELEPHONE (OUTPATIENT)
Dept: OBSTETRICS AND GYNECOLOGY | Facility: CLINIC | Age: 29
End: 2019-10-07

## 2019-10-07 VITALS
DIASTOLIC BLOOD PRESSURE: 70 MMHG | HEART RATE: 85 BPM | RESPIRATION RATE: 18 BRPM | TEMPERATURE: 98 F | OXYGEN SATURATION: 98 % | SYSTOLIC BLOOD PRESSURE: 107 MMHG

## 2019-10-07 DIAGNOSIS — R42 DIZZINESS: ICD-10-CM

## 2019-10-07 LAB
BILIRUB UR QL STRIP: NEGATIVE
CLARITY UR: CLEAR
COLOR UR: YELLOW
GLUCOSE UR QL STRIP: NEGATIVE
HGB UR QL STRIP: NEGATIVE
KETONES UR QL STRIP: NEGATIVE
LEUKOCYTE ESTERASE UR QL STRIP: ABNORMAL
MICROSCOPIC COMMENT: NORMAL
NITRITE UR QL STRIP: NEGATIVE
PH UR STRIP: 7 [PH] (ref 5–8)
PROT UR QL STRIP: NEGATIVE
SP GR UR STRIP: <=1.005 (ref 1–1.03)
SQUAMOUS #/AREA URNS HPF: 2 /HPF
URN SPEC COLLECT METH UR: ABNORMAL
UROBILINOGEN UR STRIP-ACNC: NEGATIVE EU/DL
WBC #/AREA URNS HPF: 2 /HPF (ref 0–5)

## 2019-10-07 PROCEDURE — 99211 OFF/OP EST MAY X REQ PHY/QHP: CPT | Mod: 25

## 2019-10-07 PROCEDURE — 96361 HYDRATE IV INFUSION ADD-ON: CPT

## 2019-10-07 PROCEDURE — 59025 FETAL NON-STRESS TEST: CPT

## 2019-10-07 PROCEDURE — 25000003 PHARM REV CODE 250: Performed by: ADVANCED PRACTICE MIDWIFE

## 2019-10-07 PROCEDURE — 63600175 PHARM REV CODE 636 W HCPCS: Performed by: ADVANCED PRACTICE MIDWIFE

## 2019-10-07 PROCEDURE — 59025 FETAL NON-STRESS TEST: CPT | Mod: 26,,, | Performed by: ADVANCED PRACTICE MIDWIFE

## 2019-10-07 PROCEDURE — 81000 URINALYSIS NONAUTO W/SCOPE: CPT

## 2019-10-07 PROCEDURE — 96360 HYDRATION IV INFUSION INIT: CPT

## 2019-10-07 PROCEDURE — 99213 OFFICE O/P EST LOW 20 MIN: CPT | Mod: 25,,, | Performed by: ADVANCED PRACTICE MIDWIFE

## 2019-10-07 PROCEDURE — 99213 PR OFFICE/OUTPT VISIT, EST, LEVL III, 20-29 MIN: ICD-10-PCS | Mod: 25,,, | Performed by: ADVANCED PRACTICE MIDWIFE

## 2019-10-07 PROCEDURE — 59025 OBTAIN FETAL NONSTRESS TEST (NST): ICD-10-PCS | Mod: 26,,, | Performed by: ADVANCED PRACTICE MIDWIFE

## 2019-10-07 RX ORDER — ACETAMINOPHEN 500 MG
500 TABLET ORAL EVERY 6 HOURS PRN
Status: DISCONTINUED | OUTPATIENT
Start: 2019-10-07 | End: 2019-10-07 | Stop reason: HOSPADM

## 2019-10-07 RX ORDER — ONDANSETRON 4 MG/1
4 TABLET, ORALLY DISINTEGRATING ORAL EVERY 6 HOURS PRN
Qty: 30 TABLET | Refills: 4 | Status: ON HOLD | OUTPATIENT
Start: 2019-10-07 | End: 2019-12-18 | Stop reason: HOSPADM

## 2019-10-07 RX ORDER — ONDANSETRON 8 MG/1
8 TABLET, ORALLY DISINTEGRATING ORAL EVERY 8 HOURS PRN
Status: DISCONTINUED | OUTPATIENT
Start: 2019-10-07 | End: 2019-10-07 | Stop reason: HOSPADM

## 2019-10-07 RX ORDER — SODIUM CHLORIDE, SODIUM LACTATE, POTASSIUM CHLORIDE, CALCIUM CHLORIDE 600; 310; 30; 20 MG/100ML; MG/100ML; MG/100ML; MG/100ML
INJECTION, SOLUTION INTRAVENOUS CONTINUOUS
Status: DISCONTINUED | OUTPATIENT
Start: 2019-10-07 | End: 2019-10-07 | Stop reason: HOSPADM

## 2019-10-07 RX ORDER — ONDANSETRON 8 MG/1
8 TABLET, ORALLY DISINTEGRATING ORAL ONCE
Status: COMPLETED | OUTPATIENT
Start: 2019-10-07 | End: 2019-10-07

## 2019-10-07 RX ADMIN — ONDANSETRON 8 MG: 8 TABLET, ORALLY DISINTEGRATING ORAL at 12:10

## 2019-10-07 RX ADMIN — SODIUM CHLORIDE, SODIUM LACTATE, POTASSIUM CHLORIDE, AND CALCIUM CHLORIDE 1000 ML: .6; .31; .03; .02 INJECTION, SOLUTION INTRAVENOUS at 12:10

## 2019-10-07 RX ADMIN — SODIUM CHLORIDE, SODIUM LACTATE, POTASSIUM CHLORIDE, AND CALCIUM CHLORIDE: .6; .31; .03; .02 INJECTION, SOLUTION INTRAVENOUS at 01:10

## 2019-10-07 NOTE — HOSPITAL COURSE
NST reactive  U/A WNL   zofran ODT: improved nausea   IV hydration, dizziness resolved.   Discharge home, follow up with routine ob appointment on Friday. Reviewed warning signs bleeding/fluid precautions, fetal movement, stressed hydration.

## 2019-10-07 NOTE — DISCHARGE SUMMARY
[facility] -   Obstetrics  Discharge Summary      Patient Name: Nu Noel  MRN: [id number]  Admission Date: 10/7/2019  Hospital Length of Stay: 1 days  Discharge Date and Time:  10/07/2019 2:36 PM  Attending Physician: Zuleyma Encinas MD   Discharging Provider: Manfred Bhat CNM   Primary Care Provider: Esther Membreno NP    HPI: Reports to LD c/o dizziness and nausea since this morning     FHT: 130 accelerations, no decelerations, cat 1  TOCO: None     Procedure(s) (LRB):   SECTION, WITH TUBAL LIGATION (N/A)     Hospital Course:   NST reactive  U/A WNL   zofran ODT: improved nausea   IV hydration, dizziness resolved.   Discharge home, follow up with routine ob appointment on Friday. Reviewed warning signs bleeding/fluid precautions, fetal movement, stressed hydration.          Final Active Diagnoses:    Diagnosis Date Noted POA    PRINCIPAL PROBLEM:  Dizziness [R42] 10/07/2019 Unknown      Problems Resolved During this Admission:        Labs: All labs within the past 24 hours have been reviewed        Immunizations     None          This patient has no babies on file.  Pending Diagnostic Studies:     None          Discharged Condition: good    Disposition: Home or Self Care    Follow Up:    Patient Instructions:   No discharge procedures on file.  Medications:  Current Discharge Medication List      START taking these medications    Details   ondansetron (ZOFRAN-ODT) 4 MG TbDL Take 1 tablet (4 mg total) by mouth every 6 (six) hours as needed.  Qty: 30 tablet, Refills: 4         CONTINUE these medications which have NOT CHANGED    Details   calcium citrate/vitamin D3 (CALCIUM CITRATE + D ORAL) Take 1 tablet by mouth once daily.      prenatal 25/iron fum/folic/dha (PRENATAL-1 ORAL) Take 1 tablet by mouth once daily.      terconazole (TERAZOL 7) 0.4 % Crea INSERT 1 APPLICATORFUL VAGINALLY AT BEDTIME  Refills: 0      valACYclovir (VALTREX) 500 MG tablet Take 500 mg by mouth once daily.              Manfred Bhat CNM  Obstetrics  [facility]

## 2019-10-07 NOTE — DISCHARGE INSTRUCTIONS

## 2019-10-07 NOTE — PROCEDURES
Nu Noel is a 28 y.o. female patient.    Temp: 98.4 °F (36.9 °C) (10/07/19 1212)  Pulse: 85 (10/07/19 1329)  Resp: 18 (10/07/19 1212)  BP: 107/70 (10/07/19 1325)  SpO2: 98 % (10/07/19 1329)       Obtain Fetal nonstress test (NST)  Date/Time: 10/7/2019 2:30 PM  Performed by: Manfred Bhat CNM  Authorized by: Manfred Bhat CNM     Nonstress Test:     Variability:  6-25 BPM    Decelerations:  None    Accelerations:  10 bpm    Acoustic Stimulator: No      Baseline:  130    Uterine Irritability: No      Contractions:  Not present  Biophysical Profile:     Nonstress Test Interpretation: reactive      Overall Impression:  Reassuring  Post-procedure:     Patient tolerance:  Patient tolerated the procedure well with no immediate complications        Manfred Bhat  10/7/2019    [facility]

## 2019-10-07 NOTE — SUBJECTIVE & OBJECTIVE
Obstetric HPI:  Patient reports None contractions, active fetal movement, No vaginal bleeding , No loss of fluid     This pregnancy has been complicated by   Sickle cell trait  Family hx G6PD both sons have condition  Previous c/s x2  Hsv, last outbreak . Suppressive therapy     OB History    Para Term  AB Living   4 2 1 1 1 2   SAB TAB Ectopic Multiple Live Births   0 0 0 0 2      # Outcome Date GA Lbr Nelson/2nd Weight Sex Delivery Anes PTL Lv   4 Current            3 Term 10/31/14    M CS-Unspec   VERA   2  07/15/09    M CS-Unspec   VERA      Complications: Bradycardia   1 AB               Obstetric Comments   Both sons with G6PD deficiency (x linked recessive) and first son with SC disease     Past Medical History:   Diagnosis Date    Ovarian cyst      Past Surgical History:   Procedure Laterality Date     SECTION      x2       PTA Medications   Medication Sig    calcium citrate/vitamin D3 (CALCIUM CITRATE + D ORAL) Take 1 tablet by mouth once daily.    prenatal 25/iron fum/folic/dha (PRENATAL-1 ORAL) Take 1 tablet by mouth once daily.    terconazole (TERAZOL 7) 0.4 % Crea INSERT 1 APPLICATORFUL VAGINALLY AT BEDTIME    valACYclovir (VALTREX) 500 MG tablet Take 500 mg by mouth once daily.       Review of patient's allergies indicates:   Allergen Reactions    Aspirin Rash    Ibuprofen Itching     Itching    Penicillins Rash        Family History     Problem Relation (Age of Onset)    Diabetes Maternal Grandmother, Mother    Heart disease Mother    Hypertension Maternal Grandmother, Mother    No Known Problems Paternal Grandfather, Paternal Grandmother, Father, Brother, Sister    Pancreatic cancer Maternal Grandmother        Tobacco Use    Smoking status: Never Smoker    Smokeless tobacco: Never Used   Substance and Sexual Activity    Alcohol use: No    Drug use: No    Sexual activity: Yes     Partners: Male     Birth control/protection: None     Review of Systems   Eyes:  Negative for visual disturbance.   Gastrointestinal: Positive for nausea. Negative for abdominal pain.   Genitourinary: Negative for vaginal bleeding and vaginal discharge.   Neurological: Positive for vertigo. Negative for headaches.   All other systems reviewed and are negative.     Objective:     Vital Signs (Most Recent):  Temp: 98.4 °F (36.9 °C) (10/07/19 1212)  Pulse: 85 (10/07/19 1329)  Resp: 18 (10/07/19 1212)  BP: 107/70 (10/07/19 1325)  SpO2: 98 % (10/07/19 1329) Vital Signs (24h Range):  Temp:  [98.4 °F (36.9 °C)] 98.4 °F (36.9 °C)  Pulse:  [82-91] 85  Resp:  [18] 18  SpO2:  [98 %] 98 %  BP: (107-108)/(58-70) 107/70        There is no height or weight on file to calculate BMI.    FHT: 130s moderate variability, accelerations, no decelerations, cat 1   TOCO: none    Physical Exam:   Constitutional: She is oriented to person, place, and time. She appears well-developed.    HENT:   Head: Normocephalic.     Neck: Normal range of motion.    Cardiovascular: Normal rate.     Pulmonary/Chest: Effort normal.        Abdominal: Soft.   Non tender      Genitourinary: Vagina normal.           Musculoskeletal: Normal range of motion.       Neurological: She is alert and oriented to person, place, and time.    Skin: Skin is warm and dry.    Psychiatric: She has a normal mood and affect. Her behavior is normal. Judgment and thought content normal.       Cervix:  Deferred      Significant Labs:  Lab Results   Component Value Date    GROUPTRH O POS 07/18/2019    STREPBCULT  07/08/2009     UNABLE TO QUANTITATE -STREPTOCOCCUS AGALACTIAE (GROUP B)  Ampicillin                      SENSITIVE     Clindamycin                     SENSITIVE     Erythromycin                    RESISTANT     Penicillin                      SENSITIVE     Vancomycin                      SENSITIVE            I have personallly reviewed all pertinent lab results from the last 24 hours.

## 2019-10-07 NOTE — H&P
[facility] -   Obstetrics  History & Physical    Patient Name: Nu Noel  MRN: [id number]  Admission Date: 10/7/2019  Primary Care Provider: Esther Membreno NP    Subjective:     Principal Problem:Dizziness    History of Present Illness:  Reports to LD c/o dizziness and nausea since this morning     Obstetric HPI:  Patient reports None contractions, active fetal movement, No vaginal bleeding , No loss of fluid     This pregnancy has been complicated by   Sickle cell trait  Family hx G6PD both sons have condition  Previous c/s x2  Hsv, last outbreak . Suppressive therapy     OB History    Para Term  AB Living   4 2 1 1 1 2   SAB TAB Ectopic Multiple Live Births   0 0 0 0 2      # Outcome Date GA Lbr Nelson/2nd Weight Sex Delivery Anes PTL Lv   4 Current            3 Term 10/31/14    M CS-Unspec   VERA   2  07/15/09    M CS-Unspec   VERA      Complications: Bradycardia   1 AB               Obstetric Comments   Both sons with G6PD deficiency (x linked recessive) and first son with SC disease     Past Medical History:   Diagnosis Date    Ovarian cyst      Past Surgical History:   Procedure Laterality Date     SECTION      x2       PTA Medications   Medication Sig    calcium citrate/vitamin D3 (CALCIUM CITRATE + D ORAL) Take 1 tablet by mouth once daily.    prenatal 25/iron fum/folic/dha (PRENATAL-1 ORAL) Take 1 tablet by mouth once daily.    terconazole (TERAZOL 7) 0.4 % Crea INSERT 1 APPLICATORFUL VAGINALLY AT BEDTIME    valACYclovir (VALTREX) 500 MG tablet Take 500 mg by mouth once daily.       Review of patient's allergies indicates:   Allergen Reactions    Aspirin Rash    Ibuprofen Itching     Itching    Penicillins Rash        Family History     Problem Relation (Age of Onset)    Diabetes Maternal Grandmother, Mother    Heart disease Mother    Hypertension Maternal Grandmother, Mother    No Known Problems Paternal Grandfather, Paternal Grandmother, Father,  Brother, Sister    Pancreatic cancer Maternal Grandmother        Tobacco Use    Smoking status: Never Smoker    Smokeless tobacco: Never Used   Substance and Sexual Activity    Alcohol use: No    Drug use: No    Sexual activity: Yes     Partners: Male     Birth control/protection: None     Review of Systems   Eyes: Negative for visual disturbance.   Gastrointestinal: Positive for nausea. Negative for abdominal pain.   Genitourinary: Negative for vaginal bleeding and vaginal discharge.   Neurological: Positive for vertigo. Negative for headaches.   All other systems reviewed and are negative.     Objective:     Vital Signs (Most Recent):  Temp: 98.4 °F (36.9 °C) (10/07/19 1212)  Pulse: 85 (10/07/19 1329)  Resp: 18 (10/07/19 1212)  BP: 107/70 (10/07/19 1325)  SpO2: 98 % (10/07/19 1329) Vital Signs (24h Range):  Temp:  [98.4 °F (36.9 °C)] 98.4 °F (36.9 °C)  Pulse:  [82-91] 85  Resp:  [18] 18  SpO2:  [98 %] 98 %  BP: (107-108)/(58-70) 107/70        There is no height or weight on file to calculate BMI.    FHT: 130s moderate variability, accelerations, no decelerations, cat 1   TOCO: none    Physical Exam:   Constitutional: She is oriented to person, place, and time. She appears well-developed.    HENT:   Head: Normocephalic.     Neck: Normal range of motion.    Cardiovascular: Normal rate.     Pulmonary/Chest: Effort normal.        Abdominal: Soft.   Non tender      Genitourinary: Vagina normal.           Musculoskeletal: Normal range of motion.       Neurological: She is alert and oriented to person, place, and time.    Skin: Skin is warm and dry.    Psychiatric: She has a normal mood and affect. Her behavior is normal. Judgment and thought content normal.       Cervix:  Deferred      Significant Labs:  Lab Results   Component Value Date    GROUPTRH O POS 07/18/2019    STREPBCULT  07/08/2009     UNABLE TO QUANTITATE -STREPTOCOCCUS AGALACTIAE (GROUP B)  Ampicillin                      SENSITIVE     Clindamycin                      SENSITIVE     Erythromycin                    RESISTANT     Penicillin                      SENSITIVE     Vancomycin                      SENSITIVE            I have personallly reviewed all pertinent lab results from the last 24 hours.    Assessment/Plan:     28 y.o. female  at 29w0d for:    * Dizziness  U/A  IV hydration  elizabeth Bhat CNM  Obstetrics  [facility]

## 2019-10-11 ENCOUNTER — ROUTINE PRENATAL (OUTPATIENT)
Dept: OBSTETRICS AND GYNECOLOGY | Facility: CLINIC | Age: 29
End: 2019-10-11
Payer: COMMERCIAL

## 2019-10-11 ENCOUNTER — PROCEDURE VISIT (OUTPATIENT)
Dept: OBSTETRICS AND GYNECOLOGY | Facility: CLINIC | Age: 29
End: 2019-10-11
Payer: COMMERCIAL

## 2019-10-11 VITALS
DIASTOLIC BLOOD PRESSURE: 56 MMHG | SYSTOLIC BLOOD PRESSURE: 104 MMHG | BODY MASS INDEX: 33.51 KG/M2 | WEIGHT: 189.13 LBS

## 2019-10-11 DIAGNOSIS — Z98.891 HISTORY OF 2 CESAREAN SECTIONS: Primary | ICD-10-CM

## 2019-10-11 DIAGNOSIS — Z34.90 NORMAL INTRAUTERINE PREGNANCY, ANTEPARTUM: ICD-10-CM

## 2019-10-11 PROCEDURE — 90715 TDAP VACCINE GREATER THAN OR EQUAL TO 7YO IM: ICD-10-PCS | Mod: S$GLB,,, | Performed by: OBSTETRICS & GYNECOLOGY

## 2019-10-11 PROCEDURE — 90471 IMMUNIZATION ADMIN: CPT | Mod: S$GLB,,, | Performed by: OBSTETRICS & GYNECOLOGY

## 2019-10-11 PROCEDURE — 90715 TDAP VACCINE 7 YRS/> IM: CPT | Mod: S$GLB,,, | Performed by: OBSTETRICS & GYNECOLOGY

## 2019-10-11 PROCEDURE — 90471 TDAP VACCINE GREATER THAN OR EQUAL TO 7YO IM: ICD-10-PCS | Mod: S$GLB,,, | Performed by: OBSTETRICS & GYNECOLOGY

## 2019-10-11 PROCEDURE — 76816 OB US FOLLOW-UP PER FETUS: CPT | Mod: S$GLB,,, | Performed by: OBSTETRICS & GYNECOLOGY

## 2019-10-11 PROCEDURE — 99999 PR PBB SHADOW E&M-EST. PATIENT-LVL III: ICD-10-PCS | Mod: PBBFAC,,, | Performed by: ADVANCED PRACTICE MIDWIFE

## 2019-10-11 PROCEDURE — 99999 PR PBB SHADOW E&M-EST. PATIENT-LVL III: CPT | Mod: PBBFAC,,, | Performed by: ADVANCED PRACTICE MIDWIFE

## 2019-10-11 PROCEDURE — 0502F PR SUBSEQUENT PRENATAL CARE: ICD-10-PCS | Mod: CPTII,S$GLB,, | Performed by: ADVANCED PRACTICE MIDWIFE

## 2019-10-11 PROCEDURE — 0502F SUBSEQUENT PRENATAL CARE: CPT | Mod: CPTII,S$GLB,, | Performed by: ADVANCED PRACTICE MIDWIFE

## 2019-10-11 PROCEDURE — 76816 PR  US,PREGNANT UTERUS,F/U,TRANSABD APP: ICD-10-PCS | Mod: S$GLB,,, | Performed by: OBSTETRICS & GYNECOLOGY

## 2019-10-11 NOTE — PATIENT INSTRUCTIONS
Kick Counts    Its normal to worry about your babys health. One way you can know your babys doing well is to record the babys movements once a day. This is called a kick count. Remember to take your kick count records to all your appointments with your healthcare provider.  How to count kicks  Here are tips for counting kicks:  · Choose a time when the baby is active, such as after a meal.   · Sit comfortably or lie on your side.   · The first time the baby moves, write down the time.   · Count each movement until the baby has moved 10 times. This can take from 20 minutes to 2 hours.   · Try to do it at the same time each day.  When to call your healthcare provider  Call your healthcare provider right away if you notice any of the following:  · Your baby moves fewer than 10 times in 2 hours while youre doing kick counts.  · Your baby moves much less often than on the days before.  · You have not felt your baby move all day.  Date Last Reviewed: 8/5/2015 © 2000-2017 [employer]. [address] [place] [address]. All rights reserved. This information is not intended as a substitute for professional medical care. Always follow your healthcare professional's instructions.        Recognizing Labor    The beginning of labor is the beginning of birth. Youll start to feel strong contractions. Thats when the muscles of your uterus tighten up to help push your baby out during birth.  Yes, labor has probably started   Signs of labor include:  · Your contractions are getting stronger and more painful instead of weaker. Youll probably feel them throughout your whole uterus.  · Your contractions are regular. This means that you feel them about every 5 to 10 minutes. And they are getting closer together.  · You have pink-colored or blood-streaked fluid from your vagina.  · Your water breaks. It may be a gush or a slow trickle of clear fluid from your vagina.  No, its probably not real  labor   Signs of false labor include:  · Your contractions arent regular or strong.  · You feel the contractions only in your lower uterus.  · Your contractions go away when you walk or change position.  · Your contractions go away after drinking fluids.  When to call your healthcare provider  Call your healthcare provider or clinic right away if you notice any of these signs:  · Fluid from your vagina, with or without contractions.  · Bleeding heavy enough that you need a sanitary pad.  · You dont feel your baby moving as much as before.     NOTE: Contractions are timed by both of these measures:  · The length of each contraction from its start to its finish.  · How far apart the contractions are -- the time between the start of one contraction and the start of the next contraction.   Date Last Reviewed: 8/9/2015 © 2000-2017 [employer]. [address] [place] [address], [place] [address]. All rights reserved. This information is not intended as a substitute for professional medical care. Always follow your healthcare professional's instructions.        Pregnancy and Childbirth: What to Bring to the Hospital    Youre likely feeling anxious as your childs birth approaches. This is normal. To give yourself some peace of mind, pack a bag ahead of time. Do this about 1 month ahead of your estimated delivery date. Here is a list of things to remember:  · Personal care items, like a toothbrush, hair brush, lip balm, lotion, and shampoo  · Eyeglasses (if you wear them)  · Nightgown (if you plan to breastfeed, pack 1 that allows for nursing)  · Nursing bra if you plan to breastfeed  · Bathrobe and slippers  · Many hospitals provide maternity underwear, but you may want to bring underwear that can be soiled because you will have bleeding after delivery  · Comfortable clothes for you to wear home, like sweat pants, yoga pants, or other stretchable clothes, because your prepregnancy clothes may not fit after delivery  of your baby  · Clothes for your baby to wear home  · Personal music player and headphones  · Camera with new batteries or   · Coins for vending machines  · Telephone numbers of people to call after the birth  · Cell phone and   · Insurance information and any other paperwork needed for your hospital stay  · A list of baby names you are considering  · An infant, rear-facing car seat for bringing home your baby (this is required by law)  Add anything else that you dont want to forget:  _____________________________________  _____________________________________  _____________________________________  _____________________________________  _____________________________________  _____________________________________  _____________________________________  Date Last Reviewed: 8/7/2015  © 5089-1025 [employer]. [address] [place] [address], [place] [address]. All rights reserved. This information is not intended as a substitute for professional medical care. Always follow your healthcare professional's instructions.

## 2019-10-11 NOTE — NURSING
Verified pt with two identifiers name and . Allergies and medications reviewed. TDAP administered IM to left deltoid while using aseptic technique. No discomfort noted. Pt tolerated well. Advised pt to wait 15 minutes in the lobby to monitor for side effects. Pt verbalized understanding.

## 2019-10-11 NOTE — PROGRESS NOTES
Passed glucose  US to FU views- Completed and normal  Tdap today  Not decided re nursing    Patient viewed [employer]s video, Nourish and was provided with [employer] handouts: A Good Latch and Tips for a More Comfortable Labor. Discussed nonpharmacological pain relief methods for labor, techniques and benefits of effective breastfeeding position and latch, and basic breastfeeding management. Encouraged patient to attend [facility]s Prenatal Breastfeeding Class and to download the [employer] mobile wilner if she has not already done so. Patient verbalizes understanding.

## 2019-10-25 ENCOUNTER — ROUTINE PRENATAL (OUTPATIENT)
Dept: OBSTETRICS AND GYNECOLOGY | Facility: CLINIC | Age: 29
End: 2019-10-25
Payer: COMMERCIAL

## 2019-10-25 VITALS
BODY MASS INDEX: 34.68 KG/M2 | DIASTOLIC BLOOD PRESSURE: 64 MMHG | WEIGHT: 195.75 LBS | SYSTOLIC BLOOD PRESSURE: 120 MMHG

## 2019-10-25 DIAGNOSIS — Z34.90 NORMAL INTRAUTERINE PREGNANCY, ANTEPARTUM: Primary | ICD-10-CM

## 2019-10-25 DIAGNOSIS — Z98.891 HISTORY OF 2 CESAREAN SECTIONS: ICD-10-CM

## 2019-10-25 PROCEDURE — 0502F PR SUBSEQUENT PRENATAL CARE: ICD-10-PCS | Mod: CPTII,S$GLB,, | Performed by: ADVANCED PRACTICE MIDWIFE

## 2019-10-25 PROCEDURE — 99999 PR PBB SHADOW E&M-EST. PATIENT-LVL III: ICD-10-PCS | Mod: PBBFAC,,, | Performed by: ADVANCED PRACTICE MIDWIFE

## 2019-10-25 PROCEDURE — 99999 PR PBB SHADOW E&M-EST. PATIENT-LVL III: CPT | Mod: PBBFAC,,, | Performed by: ADVANCED PRACTICE MIDWIFE

## 2019-10-25 PROCEDURE — 0502F SUBSEQUENT PRENATAL CARE: CPT | Mod: CPTII,S$GLB,, | Performed by: ADVANCED PRACTICE MIDWIFE

## 2019-10-25 NOTE — PATIENT INSTRUCTIONS

## 2019-11-07 ENCOUNTER — TELEPHONE (OUTPATIENT)
Dept: OBSTETRICS AND GYNECOLOGY | Facility: CLINIC | Age: 29
End: 2019-11-07

## 2019-11-07 NOTE — TELEPHONE ENCOUNTER
Patient has an appointment tomorrow will discuss at that time. My [employer] message sent to patient.

## 2019-11-07 NOTE — TELEPHONE ENCOUNTER
----- Message from Diamond Lyn sent at 11/7/2019  2:49 PM CST -----  Contact: Pt  Pt called in regards to getting [facility] paperwork filed out. Pt can be reached at [phone] ([email or web address]).

## 2019-11-08 ENCOUNTER — ROUTINE PRENATAL (OUTPATIENT)
Dept: OBSTETRICS AND GYNECOLOGY | Facility: CLINIC | Age: 29
End: 2019-11-08
Payer: COMMERCIAL

## 2019-11-08 VITALS
DIASTOLIC BLOOD PRESSURE: 68 MMHG | SYSTOLIC BLOOD PRESSURE: 108 MMHG | BODY MASS INDEX: 34.68 KG/M2 | WEIGHT: 195.75 LBS

## 2019-11-08 DIAGNOSIS — Z34.90 NORMAL INTRAUTERINE PREGNANCY, ANTEPARTUM: ICD-10-CM

## 2019-11-08 DIAGNOSIS — Z98.891 HISTORY OF 2 CESAREAN SECTIONS: Primary | ICD-10-CM

## 2019-11-08 PROCEDURE — 99999 PR PBB SHADOW E&M-EST. PATIENT-LVL III: ICD-10-PCS | Mod: PBBFAC,,, | Performed by: ADVANCED PRACTICE MIDWIFE

## 2019-11-08 PROCEDURE — 0502F SUBSEQUENT PRENATAL CARE: CPT | Mod: CPTII,S$GLB,, | Performed by: ADVANCED PRACTICE MIDWIFE

## 2019-11-08 PROCEDURE — 0502F PR SUBSEQUENT PRENATAL CARE: ICD-10-PCS | Mod: CPTII,S$GLB,, | Performed by: ADVANCED PRACTICE MIDWIFE

## 2019-11-08 PROCEDURE — 99999 PR PBB SHADOW E&M-EST. PATIENT-LVL III: CPT | Mod: PBBFAC,,, | Performed by: ADVANCED PRACTICE MIDWIFE

## 2019-11-08 NOTE — PATIENT INSTRUCTIONS

## 2019-11-12 ENCOUNTER — PATIENT MESSAGE (OUTPATIENT)
Dept: OBSTETRICS AND GYNECOLOGY | Facility: CLINIC | Age: 29
End: 2019-11-12

## 2019-11-12 ENCOUNTER — TELEPHONE (OUTPATIENT)
Dept: OBSTETRICS AND GYNECOLOGY | Facility: CLINIC | Age: 29
End: 2019-11-12

## 2019-11-12 NOTE — TELEPHONE ENCOUNTER
----- Message from Andres Macias sent at 11/12/2019  4:05 PM CST -----  Contact: Pt  Pt called and would like to speak with the nurse regarding her FMLA paperwork    Pt can be reached at [phone]

## 2019-11-12 NOTE — TELEPHONE ENCOUNTER
Spoke with pt. Pt stated she brought her paper work and dropped it off at the [facility]. Notified pt it will take 5-7 business days to complete. Pt is requesting the full 12 weeks off starting 12/9/19.

## 2019-11-19 ENCOUNTER — PATIENT MESSAGE (OUTPATIENT)
Dept: OBSTETRICS AND GYNECOLOGY | Facility: CLINIC | Age: 29
End: 2019-11-19

## 2019-11-20 ENCOUNTER — PATIENT MESSAGE (OUTPATIENT)
Dept: OBSTETRICS AND GYNECOLOGY | Facility: CLINIC | Age: 29
End: 2019-11-20

## 2019-11-22 ENCOUNTER — ROUTINE PRENATAL (OUTPATIENT)
Dept: OBSTETRICS AND GYNECOLOGY | Facility: CLINIC | Age: 29
End: 2019-11-22
Payer: COMMERCIAL

## 2019-11-22 VITALS — SYSTOLIC BLOOD PRESSURE: 118 MMHG | DIASTOLIC BLOOD PRESSURE: 70 MMHG | BODY MASS INDEX: 34.72 KG/M2 | WEIGHT: 196 LBS

## 2019-11-22 DIAGNOSIS — Z34.90 NORMAL INTRAUTERINE PREGNANCY, ANTEPARTUM: ICD-10-CM

## 2019-11-22 DIAGNOSIS — Z98.891 HISTORY OF 2 CESAREAN SECTIONS: Primary | ICD-10-CM

## 2019-11-22 DIAGNOSIS — A60.9 HSV (HERPES SIMPLEX VIRUS) ANOGENITAL INFECTION: ICD-10-CM

## 2019-11-22 PROCEDURE — 0502F PR SUBSEQUENT PRENATAL CARE: ICD-10-PCS | Mod: CPTII,S$GLB,, | Performed by: ADVANCED PRACTICE MIDWIFE

## 2019-11-22 PROCEDURE — 99999 PR PBB SHADOW E&M-EST. PATIENT-LVL III: ICD-10-PCS | Mod: PBBFAC,,, | Performed by: ADVANCED PRACTICE MIDWIFE

## 2019-11-22 PROCEDURE — 99999 PR PBB SHADOW E&M-EST. PATIENT-LVL III: CPT | Mod: PBBFAC,,, | Performed by: ADVANCED PRACTICE MIDWIFE

## 2019-11-22 PROCEDURE — 0502F SUBSEQUENT PRENATAL CARE: CPT | Mod: CPTII,S$GLB,, | Performed by: ADVANCED PRACTICE MIDWIFE

## 2019-11-22 NOTE — PATIENT INSTRUCTIONS

## 2019-11-22 NOTE — PROGRESS NOTES
Doing beautifully  Needs third trimester US and GBS next visit  Has appt Dr Wagoner 12/5/19  Labor precautions and kick counts reviewed    The skin of the suprapubic region was evaluated and appears clean and dry.  Counseled the patient to shower daily and to wash this area with an antibacterial soap such as Dial daily.  Advised her to not shave the hair from this area from now until after delivery.  I also counseled the patient to place antibacterial hand soap in all her bathrooms and kitchen at home to help facilitate proper hand hygiene practices before and after delivery.

## 2019-11-25 ENCOUNTER — HOSPITAL ENCOUNTER (OUTPATIENT)
Facility: HOSPITAL | Age: 29
LOS: 1 days | Discharge: HOME | End: 2019-11-26
Attending: OBSTETRICS & GYNECOLOGY | Admitting: OBSTETRICS & GYNECOLOGY
Payer: COMMERCIAL

## 2019-11-25 DIAGNOSIS — M54.9 BACK PAIN AFFECTING PREGNANCY IN THIRD TRIMESTER: ICD-10-CM

## 2019-11-25 DIAGNOSIS — M54.9 BACK PAIN AFFECTING PREGNANCY: ICD-10-CM

## 2019-11-25 DIAGNOSIS — O99.891 BACK PAIN AFFECTING PREGNANCY IN THIRD TRIMESTER: ICD-10-CM

## 2019-11-25 DIAGNOSIS — O99.891 BACK PAIN AFFECTING PREGNANCY: ICD-10-CM

## 2019-11-25 PROCEDURE — 11000001 HC ACUTE MED/SURG PRIVATE ROOM

## 2019-11-25 PROCEDURE — 99211 OFF/OP EST MAY X REQ PHY/QHP: CPT | Mod: 25

## 2019-11-25 PROCEDURE — 59025 FETAL NON-STRESS TEST: CPT

## 2019-11-26 VITALS
RESPIRATION RATE: 16 BRPM | DIASTOLIC BLOOD PRESSURE: 45 MMHG | OXYGEN SATURATION: 98 % | TEMPERATURE: 98 F | HEIGHT: 63 IN | SYSTOLIC BLOOD PRESSURE: 93 MMHG | HEART RATE: 91 BPM | BODY MASS INDEX: 34.55 KG/M2 | WEIGHT: 195 LBS

## 2019-11-26 DIAGNOSIS — Z36.89 ENCOUNTER FOR ULTRASOUND TO CHECK FETAL GROWTH: Primary | ICD-10-CM

## 2019-11-26 PROCEDURE — 99213 PR OFFICE/OUTPT VISIT, EST, LEVL III, 20-29 MIN: ICD-10-PCS | Mod: 25,,, | Performed by: ADVANCED PRACTICE MIDWIFE

## 2019-11-26 PROCEDURE — 99213 OFFICE O/P EST LOW 20 MIN: CPT | Mod: 25,,, | Performed by: ADVANCED PRACTICE MIDWIFE

## 2019-11-26 PROCEDURE — 59025 FETAL NON-STRESS TEST: CPT

## 2019-11-26 PROCEDURE — 59025 OBTAIN FETAL NONSTRESS TEST (NST): ICD-10-PCS | Mod: 26,,, | Performed by: ADVANCED PRACTICE MIDWIFE

## 2019-11-26 PROCEDURE — 59025 FETAL NON-STRESS TEST: CPT | Mod: 26,,, | Performed by: ADVANCED PRACTICE MIDWIFE

## 2019-11-26 NOTE — SUBJECTIVE & OBJECTIVE
Obstetric HPI:  Patient reports no contractions, active fetal movement, No vaginal bleeding , No loss of fluid     This pregnancy has been complicated by  SS trait, G6PD both sons    OB History    Para Term  AB Living   4 2 1 1 1 2   SAB TAB Ectopic Multiple Live Births   0 0 0 0 2      # Outcome Date GA Lbr Nelson/2nd Weight Sex Delivery Anes PTL Lv   4 Current            3 Term 10/31/14    M CS-Unspec   VERA   2  07/15/09    M CS-Unspec   VERA      Complications: Bradycardia   1 AB               Obstetric Comments   Both sons with G6PD deficiency (x linked recessive) and first son with SC disease     Past Medical History:   Diagnosis Date    Ovarian cyst      Past Surgical History:   Procedure Laterality Date     SECTION      x2       PTA Medications   Medication Sig    calcium citrate/vitamin D3 (CALCIUM CITRATE + D ORAL) Take 1 tablet by mouth once daily.    ondansetron (ZOFRAN-ODT) 4 MG TbDL Take 1 tablet (4 mg total) by mouth every 6 (six) hours as needed. (Patient not taking: Reported on 2019)    prenatal 25/iron fum/folic/dha (PRENATAL-1 ORAL) Take 1 tablet by mouth once daily.    terconazole (TERAZOL 7) 0.4 % Crea INSERT 1 APPLICATORFUL VAGINALLY AT BEDTIME    valACYclovir (VALTREX) 500 MG tablet Take 500 mg by mouth once daily.       Review of patient's allergies indicates:   Allergen Reactions    Aspirin Rash    Ibuprofen Itching     Itching    Penicillins Rash        Family History     Problem Relation (Age of Onset)    Diabetes Maternal Grandmother, Mother    Heart disease Mother    Hypertension Maternal Grandmother, Mother    No Known Problems Paternal Grandfather, Paternal Grandmother, Father, Brother, Sister    Pancreatic cancer Maternal Grandmother        Tobacco Use    Smoking status: Never Smoker    Smokeless tobacco: Never Used   Substance and Sexual Activity    Alcohol use: No    Drug use: No    Sexual activity: Yes     Partners: Male     Birth  control/protection: None     Review of Systems   Musculoskeletal: Positive for back pain.   All other systems reviewed and are negative.     Objective:     Vital Signs (Most Recent):  Temp: 97.9 °F (36.6 °C) (11/25/19 2356)  Pulse: 91 (11/25/19 2356)  Resp: 16 (11/25/19 2356)  BP: (!) 93/45 (11/25/19 2356)  SpO2: 98 % (11/25/19 2353) Vital Signs (24h Range):  Temp:  [97.8 °F (36.6 °C)-97.9 °F (36.6 °C)] 97.9 °F (36.6 °C)  Pulse:  [] 91  Resp:  [16] 16  SpO2:  [85 %-100 %] 98 %  BP: ()/(45-69) 93/45     Weight: 88.5 kg (195 lb)  Body mass index is 34.54 kg/m².    FHT:Cat 1 (reassuring)  TOCO: No contractions noted    Physical Exam:   Constitutional: She is oriented to person, place, and time. She appears well-developed and well-nourished.        Pulmonary/Chest: Effort normal.        Abdominal: Soft.     Genitourinary: Uterus normal.           Musculoskeletal: Normal range of motion and moves all extremeties.       Neurological: She is alert and oriented to person, place, and time.    Skin: Skin is warm and dry.    Psychiatric: She has a normal mood and affect. Her behavior is normal.       Cervix:  deferred     Significant Labs:  Lab Results   Component Value Date    GROUPTRH O POS 07/18/2019    HEPBSAG Negative 05/14/2019    STREPBCULT  07/08/2009     UNABLE TO QUANTITATE -STREPTOCOCCUS AGALACTIAE (GROUP B)  Ampicillin                      SENSITIVE     Clindamycin                     SENSITIVE     Erythromycin                    RESISTANT     Penicillin                      SENSITIVE     Vancomycin                      SENSITIVE            I have personallly reviewed all pertinent lab results from the last 24 hours.

## 2019-11-26 NOTE — DISCHARGE SUMMARY
[facility]  Obstetrics  Discharge Summary      Patient Name: Nu Noel  MRN: [id number]  Admission Date: 2019  Hospital Length of Stay: 1 days  Discharge Date and Time:  2019 12:07 AM  Attending Physician: Nahomi Wagoner MD   Discharging Provider: Ashlee George CNM   Primary Care Provider: Esther Membreno NP    HPI: Presents with C/O lower back pain    FHT: Cat 1 (reassuring)  TOCO: No contractions    Procedure(s) (LRB):   SECTION, WITH TUBAL LIGATION (N/A)     Hospital Course:   EFM / NSTreassuring, reactive         Final Active Diagnoses:    Diagnosis Date Noted POA    PRINCIPAL PROBLEM:  Back pain affecting pregnancy [O99.89, M54.9] 2019 Yes      Problems Resolved During this Admission:          Immunizations     None          This patient has no babies on file.  Pending Diagnostic Studies:     None          Discharged Condition: good    Disposition: Home or Self Care    Follow Up: Wednesday as scheduled with AMPARO Morris CNM    Patient Instructions:   No discharge procedures on file.  Medications:  Current Discharge Medication List      CONTINUE these medications which have NOT CHANGED    Details   calcium citrate/vitamin D3 (CALCIUM CITRATE + D ORAL) Take 1 tablet by mouth once daily.      ondansetron (ZOFRAN-ODT) 4 MG TbDL Take 1 tablet (4 mg total) by mouth every 6 (six) hours as needed.  Qty: 30 tablet, Refills: 4      prenatal 25/iron fum/folic/dha (PRENATAL-1 ORAL) Take 1 tablet by mouth once daily.      terconazole (TERAZOL 7) 0.4 % Crea INSERT 1 APPLICATORFUL VAGINALLY AT BEDTIME  Refills: 0      valACYclovir (VALTREX) 500 MG tablet Take 500 mg by mouth once daily.             Ashlee George CNM  Obstetrics  [facility]

## 2019-11-26 NOTE — H&P
[facility]   Obstetrics  History & Physical    Patient Name: Nu Noel  MRN: [id number]  Admission Date: 2019  Primary Care Provider: Esther Membreno NP    Subjective:     Principal Problem:Back pain affecting pregnancy    History of Present Illness:  Presents with C/O lower back pain    Obstetric HPI:  Patient reports no contractions, active fetal movement, No vaginal bleeding , No loss of fluid     This pregnancy has been complicated by  SS trait, G6PD both sons    OB History    Para Term  AB Living   4 2 1 1 1 2   SAB TAB Ectopic Multiple Live Births   0 0 0 0 2      # Outcome Date GA Lbr Nelson/2nd Weight Sex Delivery Anes PTL Lv   4 Current            3 Term 10/31/14    M CS-Unspec   VERA   2  07/15/09    M CS-Unspec   VERA      Complications: Bradycardia   1 AB               Obstetric Comments   Both sons with G6PD deficiency (x linked recessive) and first son with SC disease     Past Medical History:   Diagnosis Date    Ovarian cyst      Past Surgical History:   Procedure Laterality Date     SECTION      x2       PTA Medications   Medication Sig    calcium citrate/vitamin D3 (CALCIUM CITRATE + D ORAL) Take 1 tablet by mouth once daily.    ondansetron (ZOFRAN-ODT) 4 MG TbDL Take 1 tablet (4 mg total) by mouth every 6 (six) hours as needed. (Patient not taking: Reported on 2019)    prenatal 25/iron fum/folic/dha (PRENATAL-1 ORAL) Take 1 tablet by mouth once daily.    terconazole (TERAZOL 7) 0.4 % Crea INSERT 1 APPLICATORFUL VAGINALLY AT BEDTIME    valACYclovir (VALTREX) 500 MG tablet Take 500 mg by mouth once daily.       Review of patient's allergies indicates:   Allergen Reactions    Aspirin Rash    Ibuprofen Itching     Itching    Penicillins Rash        Family History     Problem Relation (Age of Onset)    Diabetes Maternal Grandmother, Mother    Heart disease Mother    Hypertension Maternal Grandmother, Mother    No Known Problems Paternal  Grandfather, Paternal Grandmother, Father, Brother, Sister    Pancreatic cancer Maternal Grandmother        Tobacco Use    Smoking status: Never Smoker    Smokeless tobacco: Never Used   Substance and Sexual Activity    Alcohol use: No    Drug use: No    Sexual activity: Yes     Partners: Male     Birth control/protection: None     Review of Systems   Musculoskeletal: Positive for back pain.   All other systems reviewed and are negative.     Objective:     Vital Signs (Most Recent):  Temp: 97.9 °F (36.6 °C) (11/25/19 2356)  Pulse: 91 (11/25/19 2356)  Resp: 16 (11/25/19 2356)  BP: (!) 93/45 (11/25/19 2356)  SpO2: 98 % (11/25/19 2353) Vital Signs (24h Range):  Temp:  [97.8 °F (36.6 °C)-97.9 °F (36.6 °C)] 97.9 °F (36.6 °C)  Pulse:  [] 91  Resp:  [16] 16  SpO2:  [85 %-100 %] 98 %  BP: ()/(45-69) 93/45     Weight: 88.5 kg (195 lb)  Body mass index is 34.54 kg/m².    FHT:Cat 1 (reassuring)  TOCO: No contractions noted    Physical Exam:   Constitutional: She is oriented to person, place, and time. She appears well-developed and well-nourished.        Pulmonary/Chest: Effort normal.        Abdominal: Soft.     Genitourinary: Uterus normal.           Musculoskeletal: Normal range of motion and moves all extremeties.       Neurological: She is alert and oriented to person, place, and time.    Skin: Skin is warm and dry.    Psychiatric: She has a normal mood and affect. Her behavior is normal.       Cervix:  deferred     Significant Labs:  Lab Results   Component Value Date    GROUPTRH O POS 07/18/2019    HEPBSAG Negative 05/14/2019    STREPBCULT  07/08/2009     UNABLE TO QUANTITATE -STREPTOCOCCUS AGALACTIAE (GROUP B)  Ampicillin                      SENSITIVE     Clindamycin                     SENSITIVE     Erythromycin                    RESISTANT     Penicillin                      SENSITIVE     Vancomycin                      SENSITIVE            I have personallly reviewed all pertinent lab results from  the last 24 hours.    Assessment/Plan:     28 y.o. female  at 36w1d for:    No notes have been filed under this hospital service.  Service: Obstetrics and Gynecology      Ashlee George CNM  Obstetrics  [facility]

## 2019-11-26 NOTE — PROCEDURES
"Nu Noel is a 28 y.o. female patient.    Temp: 97.9 °F (36.6 °C) (11/25/19 2356)  Pulse: 91 (11/25/19 2356)  Resp: 16 (11/25/19 2356)  BP: (!) 93/45 (11/25/19 2356)  SpO2: 98 % (11/25/19 2353)  Weight: 88.5 kg (195 lb) (11/25/19 2228)  Height: 5' 3" (160 cm) (11/25/19 2228)       Obtain Fetal nonstress test (NST)  Date/Time: 11/26/2019 12:08 AM  Performed by: Ashlee George CNM  Authorized by: Ashlee George CNM     Nonstress Test:     Variability:  6-25 BPM    Decelerations:  None    Accelerations:  15 bpm    Acoustic Stimulator: No      Uterine Irritability: No      Contractions:  Not present  Biophysical Profile:     Nonstress Test Interpretation: reactive      Overall Impression:  Reassuring  Post-procedure:     Patient tolerance:  Patient tolerated the procedure well with no immediate complications        Ashlee George  11/26/2019  "

## 2019-11-26 NOTE — DISCHARGE INSTRUCTIONS

## 2019-11-26 NOTE — PROGRESS NOTES
Pt discharged to home per own care. Pt is ambulatory and left per private car. Pt's vital signs are WNL and is voiding without difficulty. Discharge teaching done by RN. Pt told to go to next follow-up appointment. Stressed hydration, comfort measures, and signs of labor. Pt verbalized understanding.

## 2019-11-27 ENCOUNTER — PROCEDURE VISIT (OUTPATIENT)
Dept: OBSTETRICS AND GYNECOLOGY | Facility: CLINIC | Age: 29
End: 2019-11-27
Payer: COMMERCIAL

## 2019-11-27 ENCOUNTER — ROUTINE PRENATAL (OUTPATIENT)
Dept: OBSTETRICS AND GYNECOLOGY | Facility: CLINIC | Age: 29
End: 2019-11-27
Payer: COMMERCIAL

## 2019-11-27 VITALS
WEIGHT: 198.44 LBS | DIASTOLIC BLOOD PRESSURE: 64 MMHG | BODY MASS INDEX: 35.15 KG/M2 | SYSTOLIC BLOOD PRESSURE: 116 MMHG

## 2019-11-27 DIAGNOSIS — D57.3 SICKLE CELL TRAIT: ICD-10-CM

## 2019-11-27 DIAGNOSIS — Z98.891 HISTORY OF 2 CESAREAN SECTIONS: Primary | ICD-10-CM

## 2019-11-27 DIAGNOSIS — A60.9 HSV (HERPES SIMPLEX VIRUS) ANOGENITAL INFECTION: ICD-10-CM

## 2019-11-27 DIAGNOSIS — Z36.89 ENCOUNTER FOR ULTRASOUND TO CHECK FETAL GROWTH: ICD-10-CM

## 2019-11-27 DIAGNOSIS — Z98.891 HISTORY OF 2 CESAREAN SECTIONS: ICD-10-CM

## 2019-11-27 DIAGNOSIS — Z83.49 FAMILY HISTORY OF G6PD: ICD-10-CM

## 2019-11-27 PROCEDURE — 76819 FETAL BIOPHYS PROFIL W/O NST: CPT | Mod: S$GLB,,, | Performed by: OBSTETRICS & GYNECOLOGY

## 2019-11-27 PROCEDURE — 87184 SC STD DISK METHOD PER PLATE: CPT

## 2019-11-27 PROCEDURE — 87081 CULTURE SCREEN ONLY: CPT

## 2019-11-27 PROCEDURE — 76816 OB US FOLLOW-UP PER FETUS: CPT | Mod: S$GLB,,, | Performed by: OBSTETRICS & GYNECOLOGY

## 2019-11-27 PROCEDURE — 0502F SUBSEQUENT PRENATAL CARE: CPT | Mod: CPTII,S$GLB,, | Performed by: ADVANCED PRACTICE MIDWIFE

## 2019-11-27 PROCEDURE — 76819 PR US, OB, FETAL BIOPHYSICAL, W/O NST: ICD-10-PCS | Mod: S$GLB,,, | Performed by: OBSTETRICS & GYNECOLOGY

## 2019-11-27 PROCEDURE — 76816 PR  US,PREGNANT UTERUS,F/U,TRANSABD APP: ICD-10-PCS | Mod: S$GLB,,, | Performed by: OBSTETRICS & GYNECOLOGY

## 2019-11-27 PROCEDURE — 87147 CULTURE TYPE IMMUNOLOGIC: CPT

## 2019-11-27 PROCEDURE — 99999 PR PBB SHADOW E&M-EST. PATIENT-LVL III: CPT | Mod: PBBFAC,,, | Performed by: ADVANCED PRACTICE MIDWIFE

## 2019-11-27 PROCEDURE — 0502F PR SUBSEQUENT PRENATAL CARE: ICD-10-PCS | Mod: CPTII,S$GLB,, | Performed by: ADVANCED PRACTICE MIDWIFE

## 2019-11-27 PROCEDURE — 99999 PR PBB SHADOW E&M-EST. PATIENT-LVL III: ICD-10-PCS | Mod: PBBFAC,,, | Performed by: ADVANCED PRACTICE MIDWIFE

## 2019-11-27 NOTE — PATIENT INSTRUCTIONS

## 2019-11-27 NOTE — PROGRESS NOTES
GBS Today.  Cervix posterior barely reached no presenting part and pelvis.  Labor precautions and kick counts reviewed.   Obvious yeast infection noted advised Monistat 7 per protocol.    History HSV taking Valtrex as prescribed no history of outbreaks.    Ultrasound today vertex, MVP 6.1, EDUARDO of 20.2, BPP 8 8, EFW 6 lb 8 oz at 51 percentile -reassuring.    Has repeat  section schedule with Dr. Wagoner and will be seeing her for the remainder of the pregnancy.    Using antibacterial soap as directed

## 2019-12-02 PROBLEM — O99.820 GBS (GROUP B STREPTOCOCCUS CARRIER), +RV CULTURE, CURRENTLY PREGNANT: Status: ACTIVE | Noted: 2019-12-02

## 2019-12-02 LAB — BACTERIA SPEC AEROBE CULT: ABNORMAL

## 2019-12-05 ENCOUNTER — PATIENT MESSAGE (OUTPATIENT)
Dept: OBSTETRICS AND GYNECOLOGY | Facility: CLINIC | Age: 29
End: 2019-12-05

## 2019-12-05 ENCOUNTER — ROUTINE PRENATAL (OUTPATIENT)
Dept: OBSTETRICS AND GYNECOLOGY | Facility: CLINIC | Age: 29
End: 2019-12-05
Payer: COMMERCIAL

## 2019-12-05 VITALS — DIASTOLIC BLOOD PRESSURE: 68 MMHG | BODY MASS INDEX: 35.42 KG/M2 | SYSTOLIC BLOOD PRESSURE: 90 MMHG | WEIGHT: 199.94 LBS

## 2019-12-05 DIAGNOSIS — Z98.891 HISTORY OF 2 CESAREAN SECTIONS: Primary | ICD-10-CM

## 2019-12-05 PROCEDURE — 99999 PR PBB SHADOW E&M-EST. PATIENT-LVL II: ICD-10-PCS | Mod: PBBFAC,,, | Performed by: OBSTETRICS & GYNECOLOGY

## 2019-12-05 PROCEDURE — 0502F PR SUBSEQUENT PRENATAL CARE: ICD-10-PCS | Mod: CPTII,S$GLB,, | Performed by: OBSTETRICS & GYNECOLOGY

## 2019-12-05 PROCEDURE — 0502F SUBSEQUENT PRENATAL CARE: CPT | Mod: CPTII,S$GLB,, | Performed by: OBSTETRICS & GYNECOLOGY

## 2019-12-05 PROCEDURE — 99999 PR PBB SHADOW E&M-EST. PATIENT-LVL II: CPT | Mod: PBBFAC,,, | Performed by: OBSTETRICS & GYNECOLOGY

## 2019-12-05 NOTE — LETTER
December 5, 2019      [facility]  [address] [facility]  [place] [address]  Phone: [phone]  Fax: [phone]       Patient: Nu Noel   YOB: 1990  Date of Visit: 12/05/2019    To Whom It May Concern:    Joan Noel  was at [facility] on 12/05/2019. She may return to work on 12/5/19 with no restrictions. If you have any questions or concerns, or if I can be of further assistance, please do not hesitate to contact me.    Sincerely,    Mercy Cerna MA

## 2019-12-08 ENCOUNTER — HOSPITAL ENCOUNTER (OUTPATIENT)
Facility: HOSPITAL | Age: 29
LOS: 1 days | Discharge: HOME | End: 2019-12-08
Attending: OBSTETRICS & GYNECOLOGY | Admitting: OBSTETRICS & GYNECOLOGY
Payer: COMMERCIAL

## 2019-12-08 VITALS
BODY MASS INDEX: 35.26 KG/M2 | WEIGHT: 199 LBS | TEMPERATURE: 98 F | HEIGHT: 63 IN | HEART RATE: 93 BPM | DIASTOLIC BLOOD PRESSURE: 62 MMHG | SYSTOLIC BLOOD PRESSURE: 104 MMHG | RESPIRATION RATE: 18 BRPM

## 2019-12-08 DIAGNOSIS — B96.89 BV (BACTERIAL VAGINOSIS): Primary | ICD-10-CM

## 2019-12-08 DIAGNOSIS — N76.0 BV (BACTERIAL VAGINOSIS): Primary | ICD-10-CM

## 2019-12-08 DIAGNOSIS — N89.8 VAGINAL DISCHARGE: ICD-10-CM

## 2019-12-08 LAB
BACTERIA #/AREA URNS HPF: NORMAL /HPF
BILIRUB UR QL STRIP: NEGATIVE
CLARITY UR: CLEAR
COLOR UR: YELLOW
GLUCOSE UR QL STRIP: NEGATIVE
HGB UR QL STRIP: NEGATIVE
KETONES UR QL STRIP: NEGATIVE
LEUKOCYTE ESTERASE UR QL STRIP: ABNORMAL
MICROSCOPIC COMMENT: NORMAL
NITRITE UR QL STRIP: NEGATIVE
PH UR STRIP: 7 [PH] (ref 5–8)
PROT UR QL STRIP: NEGATIVE
SP GR UR STRIP: 1.01 (ref 1–1.03)
SQUAMOUS #/AREA URNS HPF: 3 /HPF
URN SPEC COLLECT METH UR: ABNORMAL
UROBILINOGEN UR STRIP-ACNC: NEGATIVE EU/DL
WBC #/AREA URNS HPF: 2 /HPF (ref 0–5)

## 2019-12-08 PROCEDURE — 99212 PR OFFICE/OUTPT VISIT, EST, LEVL II, 10-19 MIN: ICD-10-PCS | Mod: 25,,, | Performed by: OBSTETRICS & GYNECOLOGY

## 2019-12-08 PROCEDURE — 59025 PR FETAL 2N-STRESS TEST: ICD-10-PCS | Mod: 26,,, | Performed by: OBSTETRICS & GYNECOLOGY

## 2019-12-08 PROCEDURE — 59025 FETAL NON-STRESS TEST: CPT | Mod: 26,,, | Performed by: OBSTETRICS & GYNECOLOGY

## 2019-12-08 PROCEDURE — 81000 URINALYSIS NONAUTO W/SCOPE: CPT

## 2019-12-08 PROCEDURE — 99211 OFF/OP EST MAY X REQ PHY/QHP: CPT | Mod: 25

## 2019-12-08 PROCEDURE — 87491 CHLMYD TRACH DNA AMP PROBE: CPT

## 2019-12-08 PROCEDURE — 99212 OFFICE O/P EST SF 10 MIN: CPT | Mod: 25,,, | Performed by: OBSTETRICS & GYNECOLOGY

## 2019-12-08 RX ORDER — METRONIDAZOLE 500 MG/1
500 TABLET ORAL 2 TIMES DAILY
Qty: 14 TABLET | Refills: 1 | Status: ON HOLD | OUTPATIENT
Start: 2019-12-08 | End: 2019-12-18 | Stop reason: HOSPADM

## 2019-12-08 NOTE — DISCHARGE INSTRUCTIONS

## 2019-12-08 NOTE — DISCHARGE SUMMARY
[facility]   Obstetrics  Discharge Summary      Patient Name: Nu Noel  MRN: [id number]  Admission Date: 2019  Hospital Length of Stay: 1 days  Discharge Date and Time:  2019 9:03 AM  Attending Physician: Nahomi Wagoner MD   Discharging Provider: Nahomi Wagoner MD   Primary Care Provider: Esther Membreno NP    HPI:  28 y.o.  37w6d c/o SROM. Diagnosed w BV.          Procedure(s) (LRB):   SECTION, WITH TUBAL LIGATION (N/A)     Hospital Course:   No notes on file         Final Active Diagnoses:    Diagnosis Date Noted POA    PRINCIPAL PROBLEM:  BV (bacterial vaginosis) [N76.0, B96.89] 2019 Yes    Vaginal discharge [N89.8] 2019 Yes      Problems Resolved During this Admission:                Immunizations     None          This patient has no babies on file.  Pending Diagnostic Studies:     Procedure Component Value Units Date/Time    US Fetal Biophysical Wo Nonstress Test [[id number]]     Order Status:  Sent Lab Status:  No result           Discharged Condition: good    Disposition: Home or Self Care    Follow Up:    Patient Instructions:      Call MD for:  temperature >100.4     Call MD for:  persistent nausea and vomiting or diarrhea     Call MD for:  severe uncontrolled pain     Call MD for:  redness, tenderness, or signs of infection (pain, swelling, redness, odor or green/yellow discharge around incision site)     Call MD for:  difficulty breathing or increased cough     Call MD for:  severe persistent headache     Call MD for:  persistent dizziness, light-headedness, or visual disturbances     Call MD for:  increased confusion or weakness     Call MD for:   Order Comments: Return if any vaginal bleeding, rupture of waterbag, contractions, decreased fetal movemnt.     Medications:  Current Discharge Medication List      START taking these medications    Details   metroNIDAZOLE (FLAGYL) 500 MG tablet Take 1 tablet (500 mg total) by mouth 2 (two)  times daily. for 7 days  Qty: 14 tablet, Refills: 1         CONTINUE these medications which have NOT CHANGED    Details   calcium citrate/vitamin D3 (CALCIUM CITRATE + D ORAL) Take 1 tablet by mouth once daily.      ondansetron (ZOFRAN-ODT) 4 MG TbDL Take 1 tablet (4 mg total) by mouth every 6 (six) hours as needed.  Qty: 30 tablet, Refills: 4      prenatal 25/iron fum/folic/dha (PRENATAL-1 ORAL) Take 1 tablet by mouth once daily.      valACYclovir (VALTREX) 500 MG tablet Take 500 mg by mouth once daily.         STOP taking these medications       terconazole (TERAZOL 7) 0.4 % Crea Comments:   Reason for Stopping:               Nahomi Wagoner MD  Obstetrics  [facility]

## 2019-12-08 NOTE — H&P
28 y.o. Nu Noel  37w6d c/o one day of vaginal d/c wondering if it is her waterbag breaking. D/c is white/yellow and smells like 'medicine'. No new cramps (has had some lower pelvic cramps for 2wks, does not think they are contractions), no VB, no dysuria.  Feels good FM.         Patient Active Problem List    Diagnosis Date Noted    Vaginal discharge 2019    GBS (group B Streptococcus carrier), +RV culture, currently pregnant 2019    Back pain affecting pregnancy 2019    Dizziness 10/07/2019    Evaluate anatomy not seen on prior sonogram 2019    HSV (herpes simplex virus) anogenital infection 2019    Normal intrauterine pregnancy, antepartum 2019    History of 2  sections 2019    Family history of G6PD - both sons have it (x linked rec) 2019    Sickle cell trait 2019       Past Medical History:   Diagnosis Date    Ovarian cyst        Past Surgical History:   Procedure Laterality Date     SECTION      x2       Family History   Problem Relation Age of Onset    No Known Problems Paternal Grandfather     No Known Problems Paternal Grandmother     Diabetes Maternal Grandmother     Hypertension Maternal Grandmother     Pancreatic cancer Maternal Grandmother     No Known Problems Father     Diabetes Mother     Hypertension Mother     Heart disease Mother         enlarged heart     No Known Problems Brother     No Known Problems Sister        Social History     Socioeconomic History    Marital status:      Spouse name: Not on file    Number of children: Not on file    Years of education: Not on file    Highest education level: Not on file   Occupational History    Not on file   Social Needs    Financial resource strain: Not on file    Food insecurity:     Worry: Not on file     Inability: Not on file    Transportation needs:     Medical: Not on file     Non-medical: Not on file   Tobacco Use     Smoking status: Never Smoker    Smokeless tobacco: Never Used   Substance and Sexual Activity    Alcohol use: No    Drug use: No    Sexual activity: Yes     Partners: Male     Birth control/protection: None   Lifestyle    Physical activity:     Days per week: Not on file     Minutes per session: Not on file    Stress: Not on file   Relationships    Social connections:     Talks on phone: Not on file     Gets together: Not on file     Attends Rastafarian service: Not on file     Active member of club or organization: Not on file     Attends meetings of clubs or organizations: Not on file     Relationship status: Not on file   Other Topics Concern    Not on file   Social History Narrative    Not on file       No current facility-administered medications for this encounter.        Review of patient's allergies indicates:   Allergen Reactions    Aspirin Rash    Ibuprofen Itching     Itching    Penicillins Rash              PHYSICAL EXAM        APPEARANCE: Well nourished, well developed, in no acute distress.  EXTREMITIES: no jh's, calf tenderness, 1+ edema b/l  ABDOMEN: Soft. No tenderness or masses.  Fundus soft NT  PELVIC:   VULVA: No lesions. Normal female genitalia.  URETHRAL MEATUS: Normal size and location, no lesions, no prolapse.  URETHRA: No masses, tenderness, prolapse or scarring.  VAGINA: Moist and well rugated, white creamy discharge, no pooling  CERVIX: LTC NT     Wet prep clue cells  Nitrazine negative  Fern test negative  NST 140s cat 1 reassuring, no contractions    ASSESSMENT    BV  Irregular cramps    PLAN   will treat with flagyl  Check u/a ucx, gc/chl  BPP: 8/8    Labor precautions given  D/c home

## 2019-12-09 LAB
C TRACH DNA SPEC QL NAA+PROBE: NOT DETECTED
N GONORRHOEA DNA SPEC QL NAA+PROBE: NOT DETECTED

## 2019-12-12 ENCOUNTER — ROUTINE PRENATAL (OUTPATIENT)
Dept: OBSTETRICS AND GYNECOLOGY | Facility: CLINIC | Age: 29
End: 2019-12-12
Payer: COMMERCIAL

## 2019-12-12 VITALS
BODY MASS INDEX: 34.84 KG/M2 | DIASTOLIC BLOOD PRESSURE: 64 MMHG | SYSTOLIC BLOOD PRESSURE: 108 MMHG | WEIGHT: 196.63 LBS

## 2019-12-12 DIAGNOSIS — Z98.891 HISTORY OF 2 CESAREAN SECTIONS: Primary | ICD-10-CM

## 2019-12-12 PROCEDURE — 0502F PR SUBSEQUENT PRENATAL CARE: ICD-10-PCS | Mod: CPTII,S$GLB,, | Performed by: OBSTETRICS & GYNECOLOGY

## 2019-12-12 PROCEDURE — 0502F SUBSEQUENT PRENATAL CARE: CPT | Mod: CPTII,S$GLB,, | Performed by: OBSTETRICS & GYNECOLOGY

## 2019-12-12 PROCEDURE — 99999 PR PBB SHADOW E&M-EST. PATIENT-LVL II: CPT | Mod: PBBFAC,,, | Performed by: OBSTETRICS & GYNECOLOGY

## 2019-12-12 PROCEDURE — 99999 PR PBB SHADOW E&M-EST. PATIENT-LVL II: ICD-10-PCS | Mod: PBBFAC,,, | Performed by: OBSTETRICS & GYNECOLOGY

## 2019-12-12 NOTE — PROGRESS NOTES
C/o occ pains 2x a day randomly, possible contractions, up to 8/10 for a few seconds, spontaneous resolution.   No UTI or outbreak symptoms. Taking valtrex and flagyl.    No  contractions or rupture of membranes.  Discussed and reviewed: fetal movements/kick counts, spontaneous rupture of membranes, pre-eclampsia precautions. Reminded not to shave in bikini area, and of hibiclens prep.

## 2019-12-16 ENCOUNTER — ANESTHESIA EVENT (OUTPATIENT)
Dept: OBSTETRICS AND GYNECOLOGY | Facility: HOSPITAL | Age: 29
End: 2019-12-16
Payer: COMMERCIAL

## 2019-12-16 ENCOUNTER — HOSPITAL ENCOUNTER (INPATIENT)
Facility: HOSPITAL | Age: 29
LOS: 2 days | Discharge: HOME | End: 2019-12-18
Attending: OBSTETRICS & GYNECOLOGY | Admitting: OBSTETRICS & GYNECOLOGY
Payer: COMMERCIAL

## 2019-12-16 ENCOUNTER — ANESTHESIA (OUTPATIENT)
Dept: OBSTETRICS AND GYNECOLOGY | Facility: HOSPITAL | Age: 29
End: 2019-12-16
Payer: COMMERCIAL

## 2019-12-16 DIAGNOSIS — O34.219 HISTORY OF CESAREAN DELIVERY, ANTEPARTUM: ICD-10-CM

## 2019-12-16 DIAGNOSIS — Z98.891 S/P CESAREAN SECTION: ICD-10-CM

## 2019-12-16 DIAGNOSIS — Z83.49 FAMILY HISTORY OF G6PD: ICD-10-CM

## 2019-12-16 DIAGNOSIS — D57.3 SICKLE CELL TRAIT: Primary | ICD-10-CM

## 2019-12-16 DIAGNOSIS — A60.9 HSV (HERPES SIMPLEX VIRUS) ANOGENITAL INFECTION: ICD-10-CM

## 2019-12-16 LAB
ABO + RH BLD: NORMAL
BASOPHILS # BLD AUTO: 0.02 K/UL (ref 0–0.2)
BASOPHILS NFR BLD: 0.2 % (ref 0–1.9)
BLD GP AB SCN CELLS X3 SERPL QL: NORMAL
DIFFERENTIAL METHOD: ABNORMAL
EOSINOPHIL # BLD AUTO: 0.1 K/UL (ref 0–0.5)
EOSINOPHIL NFR BLD: 0.7 % (ref 0–8)
ERYTHROCYTE [DISTWIDTH] IN BLOOD BY AUTOMATED COUNT: 13.7 % (ref 11.5–14.5)
HCT VFR BLD AUTO: 38 % (ref 37–48.5)
HGB BLD-MCNC: 12.2 G/DL (ref 12–16)
IMM GRANULOCYTES # BLD AUTO: 0.05 K/UL (ref 0–0.04)
IMM GRANULOCYTES NFR BLD AUTO: 0.5 % (ref 0–0.5)
LYMPHOCYTES # BLD AUTO: 1.8 K/UL (ref 1–4.8)
LYMPHOCYTES NFR BLD: 16.6 % (ref 18–48)
MCH RBC QN AUTO: 30.3 PG (ref 27–31)
MCHC RBC AUTO-ENTMCNC: 32.1 G/DL (ref 32–36)
MCV RBC AUTO: 95 FL (ref 82–98)
MONOCYTES # BLD AUTO: 0.8 K/UL (ref 0.3–1)
MONOCYTES NFR BLD: 7.2 % (ref 4–15)
NEUTROPHILS # BLD AUTO: 8.1 K/UL (ref 1.8–7.7)
NEUTROPHILS NFR BLD: 74.8 % (ref 38–73)
NRBC BLD-RTO: 0 /100 WBC
PLATELET # BLD AUTO: 170 K/UL (ref 150–350)
PMV BLD AUTO: 13.3 FL (ref 9.2–12.9)
RBC # BLD AUTO: 4.02 M/UL (ref 4–5.4)
WBC # BLD AUTO: 10.76 K/UL (ref 3.9–12.7)

## 2019-12-16 PROCEDURE — 88302 TISSUE EXAM BY PATHOLOGIST: CPT | Mod: 59 | Performed by: PATHOLOGY

## 2019-12-16 PROCEDURE — 63600175 PHARM REV CODE 636 W HCPCS: Performed by: NURSE ANESTHETIST, CERTIFIED REGISTERED

## 2019-12-16 PROCEDURE — 58611 PR LIGATION,FALLOPIAN TUBE W/C-SECTION: ICD-10-PCS | Mod: AS,,, | Performed by: PHYSICIAN ASSISTANT

## 2019-12-16 PROCEDURE — 63600175 PHARM REV CODE 636 W HCPCS: Performed by: OBSTETRICS & GYNECOLOGY

## 2019-12-16 PROCEDURE — 63600175 PHARM REV CODE 636 W HCPCS: Performed by: ANESTHESIOLOGY

## 2019-12-16 PROCEDURE — 59510 PR FULL ROUT OBSTE CARE,CESAREAN DELIV: ICD-10-PCS | Mod: GB,,, | Performed by: OBSTETRICS & GYNECOLOGY

## 2019-12-16 PROCEDURE — 88302 PR  SURG PATH,LEVEL II: ICD-10-PCS | Mod: 26,,, | Performed by: PATHOLOGY

## 2019-12-16 PROCEDURE — 37000008 HC ANESTHESIA 1ST 15 MINUTES: Performed by: OBSTETRICS & GYNECOLOGY

## 2019-12-16 PROCEDURE — 88302 TISSUE EXAM BY PATHOLOGIST: CPT | Mod: 26,,, | Performed by: PATHOLOGY

## 2019-12-16 PROCEDURE — 11000001 HC ACUTE MED/SURG PRIVATE ROOM

## 2019-12-16 PROCEDURE — 85025 COMPLETE CBC W/AUTO DIFF WBC: CPT

## 2019-12-16 PROCEDURE — 58611 PR LIGATION,FALLOPIAN TUBE W/C-SECTION: ICD-10-PCS | Mod: ,,, | Performed by: OBSTETRICS & GYNECOLOGY

## 2019-12-16 PROCEDURE — 25000003 PHARM REV CODE 250: Performed by: OBSTETRICS & GYNECOLOGY

## 2019-12-16 PROCEDURE — 59510 CESAREAN DELIVERY: CPT | Mod: GB,,, | Performed by: OBSTETRICS & GYNECOLOGY

## 2019-12-16 PROCEDURE — 25000003 PHARM REV CODE 250: Performed by: ANESTHESIOLOGY

## 2019-12-16 PROCEDURE — S0020 INJECTION, BUPIVICAINE HYDRO: HCPCS | Performed by: ANESTHESIOLOGY

## 2019-12-16 PROCEDURE — 27200688 HC TRAY, SPINAL-HYPER/ ISOBARIC: Performed by: NURSE ANESTHETIST, CERTIFIED REGISTERED

## 2019-12-16 PROCEDURE — 58611 LIGATE OVIDUCT(S) ADD-ON: CPT | Mod: AS,,, | Performed by: PHYSICIAN ASSISTANT

## 2019-12-16 PROCEDURE — 58611 LIGATE OVIDUCT(S) ADD-ON: CPT | Mod: ,,, | Performed by: OBSTETRICS & GYNECOLOGY

## 2019-12-16 PROCEDURE — 59514 PR CESAREAN DELIVERY ONLY: ICD-10-PCS | Mod: AS,GB,, | Performed by: PHYSICIAN ASSISTANT

## 2019-12-16 PROCEDURE — 36000680 HC C/S TUBAL LIGATION LEVEL I

## 2019-12-16 PROCEDURE — 25000003 PHARM REV CODE 250: Performed by: NURSE ANESTHETIST, CERTIFIED REGISTERED

## 2019-12-16 PROCEDURE — 37000009 HC ANESTHESIA EA ADD 15 MINS: Performed by: OBSTETRICS & GYNECOLOGY

## 2019-12-16 PROCEDURE — 86850 RBC ANTIBODY SCREEN: CPT

## 2019-12-16 PROCEDURE — 59514 CESAREAN DELIVERY ONLY: CPT | Mod: AS,GB,, | Performed by: PHYSICIAN ASSISTANT

## 2019-12-16 RX ORDER — HYDROMORPHONE HYDROCHLORIDE 2 MG/ML
2 INJECTION, SOLUTION INTRAMUSCULAR; INTRAVENOUS; SUBCUTANEOUS
Status: DISCONTINUED | OUTPATIENT
Start: 2019-12-16 | End: 2019-12-18 | Stop reason: HOSPADM

## 2019-12-16 RX ORDER — CEFAZOLIN SODIUM 2 G/50ML
2 SOLUTION INTRAVENOUS
Status: DISCONTINUED | OUTPATIENT
Start: 2019-12-16 | End: 2019-12-16

## 2019-12-16 RX ORDER — SODIUM CHLORIDE, SODIUM LACTATE, POTASSIUM CHLORIDE, CALCIUM CHLORIDE 600; 310; 30; 20 MG/100ML; MG/100ML; MG/100ML; MG/100ML
INJECTION, SOLUTION INTRAVENOUS CONTINUOUS
Status: ACTIVE | OUTPATIENT
Start: 2019-12-16 | End: 2019-12-16

## 2019-12-16 RX ORDER — DOCUSATE SODIUM 100 MG/1
200 CAPSULE, LIQUID FILLED ORAL 2 TIMES DAILY PRN
Status: DISCONTINUED | OUTPATIENT
Start: 2019-12-16 | End: 2019-12-18 | Stop reason: HOSPADM

## 2019-12-16 RX ORDER — CHLORHEXIDINE GLUCONATE ORAL RINSE 1.2 MG/ML
10 SOLUTION DENTAL
Status: DISCONTINUED | OUTPATIENT
Start: 2019-12-16 | End: 2019-12-16

## 2019-12-16 RX ORDER — CHLORHEXIDINE GLUCONATE ORAL RINSE 1.2 MG/ML
10 SOLUTION DENTAL 2 TIMES DAILY
Status: DISCONTINUED | OUTPATIENT
Start: 2019-12-16 | End: 2019-12-18 | Stop reason: HOSPADM

## 2019-12-16 RX ORDER — SIMETHICONE 80 MG
1 TABLET,CHEWABLE ORAL EVERY 6 HOURS PRN
Status: DISCONTINUED | OUTPATIENT
Start: 2019-12-16 | End: 2019-12-18 | Stop reason: HOSPADM

## 2019-12-16 RX ORDER — SODIUM CITRATE AND CITRIC ACID MONOHYDRATE 334; 500 MG/5ML; MG/5ML
30 SOLUTION ORAL
Status: DISCONTINUED | OUTPATIENT
Start: 2019-12-16 | End: 2019-12-16

## 2019-12-16 RX ORDER — SODIUM CHLORIDE, SODIUM LACTATE, POTASSIUM CHLORIDE, CALCIUM CHLORIDE 600; 310; 30; 20 MG/100ML; MG/100ML; MG/100ML; MG/100ML
INJECTION, SOLUTION INTRAVENOUS CONTINUOUS
Status: DISCONTINUED | OUTPATIENT
Start: 2019-12-16 | End: 2019-12-16

## 2019-12-16 RX ORDER — HYDROCODONE BITARTRATE AND ACETAMINOPHEN 10; 325 MG/1; MG/1
1 TABLET ORAL EVERY 4 HOURS PRN
Status: DISCONTINUED | OUTPATIENT
Start: 2019-12-16 | End: 2019-12-18 | Stop reason: HOSPADM

## 2019-12-16 RX ORDER — AMOXICILLIN 250 MG
1 CAPSULE ORAL NIGHTLY
Status: DISCONTINUED | OUTPATIENT
Start: 2019-12-16 | End: 2019-12-18 | Stop reason: HOSPADM

## 2019-12-16 RX ORDER — ONDANSETRON 8 MG/1
8 TABLET, ORALLY DISINTEGRATING ORAL EVERY 8 HOURS PRN
Status: DISCONTINUED | OUTPATIENT
Start: 2019-12-16 | End: 2019-12-18 | Stop reason: HOSPADM

## 2019-12-16 RX ORDER — CEFAZOLIN SODIUM 1 G/3ML
INJECTION, POWDER, FOR SOLUTION INTRAMUSCULAR; INTRAVENOUS
Status: DISCONTINUED | OUTPATIENT
Start: 2019-12-16 | End: 2019-12-16

## 2019-12-16 RX ORDER — ONDANSETRON 2 MG/ML
INJECTION INTRAMUSCULAR; INTRAVENOUS
Status: DISCONTINUED | OUTPATIENT
Start: 2019-12-16 | End: 2019-12-16

## 2019-12-16 RX ORDER — HYDROCORTISONE 25 MG/G
CREAM TOPICAL 3 TIMES DAILY PRN
Status: DISCONTINUED | OUTPATIENT
Start: 2019-12-16 | End: 2019-12-18 | Stop reason: HOSPADM

## 2019-12-16 RX ORDER — MISOPROSTOL 200 UG/1
800 TABLET ORAL
Status: DISCONTINUED | OUTPATIENT
Start: 2019-12-16 | End: 2019-12-16

## 2019-12-16 RX ORDER — HYDROCODONE BITARTRATE AND ACETAMINOPHEN 5; 325 MG/1; MG/1
1 TABLET ORAL EVERY 4 HOURS PRN
Status: DISCONTINUED | OUTPATIENT
Start: 2019-12-16 | End: 2019-12-18 | Stop reason: HOSPADM

## 2019-12-16 RX ORDER — OXYTOCIN/RINGER'S LACTATE 30/500 ML
334 PLASTIC BAG, INJECTION (ML) INTRAVENOUS ONCE
Status: COMPLETED | OUTPATIENT
Start: 2019-12-16 | End: 2019-12-16

## 2019-12-16 RX ORDER — ADHESIVE BANDAGE
30 BANDAGE TOPICAL 2 TIMES DAILY PRN
Status: DISCONTINUED | OUTPATIENT
Start: 2019-12-17 | End: 2019-12-18 | Stop reason: HOSPADM

## 2019-12-16 RX ORDER — OXYTOCIN/RINGER'S LACTATE 30/500 ML
95 PLASTIC BAG, INJECTION (ML) INTRAVENOUS ONCE
Status: DISCONTINUED | OUTPATIENT
Start: 2019-12-16 | End: 2019-12-16

## 2019-12-16 RX ORDER — OXYTOCIN/RINGER'S LACTATE 30/500 ML
41.65 PLASTIC BAG, INJECTION (ML) INTRAVENOUS CONTINUOUS
Status: DISPENSED | OUTPATIENT
Start: 2019-12-16 | End: 2019-12-16

## 2019-12-16 RX ORDER — ONDANSETRON 8 MG/1
8 TABLET, ORALLY DISINTEGRATING ORAL ONCE
Status: COMPLETED | OUTPATIENT
Start: 2019-12-16 | End: 2019-12-16

## 2019-12-16 RX ORDER — BISACODYL 10 MG
10 SUPPOSITORY, RECTAL RECTAL ONCE AS NEEDED
Status: DISCONTINUED | OUTPATIENT
Start: 2019-12-16 | End: 2019-12-18 | Stop reason: HOSPADM

## 2019-12-16 RX ORDER — MORPHINE SULFATE 1 MG/ML
INJECTION, SOLUTION EPIDURAL; INTRATHECAL; INTRAVENOUS
Status: COMPLETED | OUTPATIENT
Start: 2019-12-16 | End: 2019-12-16

## 2019-12-16 RX ORDER — DIPHENHYDRAMINE HCL 25 MG
25 CAPSULE ORAL EVERY 4 HOURS PRN
Status: DISCONTINUED | OUTPATIENT
Start: 2019-12-16 | End: 2019-12-18 | Stop reason: HOSPADM

## 2019-12-16 RX ORDER — BUPIVACAINE HYDROCHLORIDE 7.5 MG/ML
INJECTION, SOLUTION EPIDURAL; RETROBULBAR
Status: COMPLETED | OUTPATIENT
Start: 2019-12-16 | End: 2019-12-16

## 2019-12-16 RX ADMIN — PROMETHAZINE HYDROCHLORIDE 12.5 MG: 25 INJECTION INTRAMUSCULAR; INTRAVENOUS at 09:12

## 2019-12-16 RX ADMIN — SODIUM CHLORIDE, SODIUM LACTATE, POTASSIUM CHLORIDE, AND CALCIUM CHLORIDE: 600; 310; 30; 20 INJECTION, SOLUTION INTRAVENOUS at 07:12

## 2019-12-16 RX ADMIN — CEFAZOLIN 2 G: 1 INJECTION, POWDER, FOR SOLUTION INTRAMUSCULAR; INTRAVENOUS at 07:12

## 2019-12-16 RX ADMIN — MORPHINE SULFATE 0.3 MG: 1 INJECTION, SOLUTION EPIDURAL; INTRATHECAL; INTRAVENOUS at 07:12

## 2019-12-16 RX ADMIN — SODIUM CHLORIDE, SODIUM LACTATE, POTASSIUM CHLORIDE, AND CALCIUM CHLORIDE: 600; 310; 30; 20 INJECTION, SOLUTION INTRAVENOUS at 08:12

## 2019-12-16 RX ADMIN — SODIUM CHLORIDE, SODIUM LACTATE, POTASSIUM CHLORIDE, AND CALCIUM CHLORIDE 1000 ML: .6; .31; .03; .02 INJECTION, SOLUTION INTRAVENOUS at 06:12

## 2019-12-16 RX ADMIN — BUPIVACAINE HYDROCHLORIDE 1.5 ML: 7.5 INJECTION, SOLUTION EPIDURAL; RETROBULBAR at 07:12

## 2019-12-16 RX ADMIN — PROMETHAZINE HYDROCHLORIDE 12.5 MG: 25 INJECTION INTRAMUSCULAR; INTRAVENOUS at 04:12

## 2019-12-16 RX ADMIN — Medication 334 ML/HR: at 08:12

## 2019-12-16 RX ADMIN — SODIUM CHLORIDE, SODIUM LACTATE, POTASSIUM CHLORIDE, AND CALCIUM CHLORIDE: 600; 310; 30; 20 INJECTION, SOLUTION INTRAVENOUS at 06:12

## 2019-12-16 RX ADMIN — ONDANSETRON 8 MG: 8 TABLET, ORALLY DISINTEGRATING ORAL at 09:12

## 2019-12-16 RX ADMIN — ONDANSETRON 4 MG: 2 INJECTION, SOLUTION INTRAMUSCULAR; INTRAVENOUS at 07:12

## 2019-12-16 RX ADMIN — HYDROMORPHONE HYDROCHLORIDE 2 MG: 2 INJECTION INTRAMUSCULAR; INTRAVENOUS; SUBCUTANEOUS at 10:12

## 2019-12-16 RX ADMIN — SENNOSIDES, DOCUSATE SODIUM 1 TABLET: 50; 8.6 TABLET, FILM COATED ORAL at 09:12

## 2019-12-16 NOTE — NURSING

## 2019-12-16 NOTE — HPI
Nu Noel  39w0d with history 2  deliveries, desires repeat . She also has a history of genital herpes, no outbreaks this pregnancy and she is taking her acyclovir suppression, sickle cell trait condition, family history significant for her sons both with G6PD.

## 2019-12-16 NOTE — PROGRESS NOTES
[facility] -   Obstetrics  Antepartum Progress Note    Patient Name: Nu Noel  MRN: [id number]  Admission Date: 2019  Hospital Length of Stay: 0 days  Attending Physician: Nahomi Wagoner MD  Primary Care Provider: Esther Membreno NP    Subjective:     Principal Problem:<principal problem not specified>    HPI:  Nu Noel  39w0d with history 2  deliveries, desires repeat . She also has a history of genital herpes, no outbreaks this pregnancy and she is taking her acyclovir suppression, sickle cell trait condition, family history significant for her sons both with G6PD.    Hospital Course:  No notes on file    Obstetric HPI:        Objective:     Vital Signs (Most Recent):  Pulse: 87 (19 0700)  BP: (!) 97/46 (19 0700)  SpO2: 100 % (19 07) Vital Signs (24h Range):  Pulse:  [87-92] 87  SpO2:  [100 %] 100 %  BP: ()/(46-75) 97/46     Weight: 88.9 kg (196 lb)  Body mass index is 34.72 kg/m².    FHT:  140 Cat 1 (reassuring)       No intake or output data in the 24 hours ending 19 0738       Significant Labs:  Recent Lab Results       19  0610        Baso # 0.02     Basophil% 0.2     Differential Method Automated     Eos # 0.1     Eosinophil% 0.7     Gran # (ANC) 8.1     Gran% 74.8     Hematocrit 38.0     Hemoglobin 12.2     Immature Grans (Abs) 0.05  Comment:  Mild elevation in immature granulocytes is non specific and   can be seen in a variety of conditions including stress response,   acute inflammation, trauma and pregnancy. Correlation with other   laboratory and clinical findings is essential.       Immature Granulocytes 0.5     Lymph # 1.8     Lymph% 16.6     MCH 30.3     MCHC 32.1     MCV 95     Mono # 0.8     Mono% 7.2     MPV 13.3     nRBC 0     Platelets 170     RBC 4.02     RDW 13.7     WBC 10.76           Physical Exam:   Constitutional: She is oriented to person, place, and time. She appears well-developed and  well-nourished.        Pulmonary/Chest: Effort normal.        Abdominal: Soft. She exhibits no distension. There is no tenderness. There is no guarding.             Musculoskeletal: Normal range of motion.       Neurological: She is alert and oriented to person, place, and time. She has normal reflexes.    Skin: Skin is warm and dry.    Psychiatric: She has a normal mood and affect. Her behavior is normal. Judgment and thought content normal.       Assessment/Plan:     28 y.o. female  at 39w0d for:    History of  delivery, antepartum       HSV (herpes simplex virus) anogenital infection   Taking suppressive acyclovir. No outbreaks this pregnancy.    Family history of G6PD - both sons have it (x linked rec)   Delivery staff notified.    Sickle cell trait             Nahomi Wagoner MD  Obstetrics  [facility] -

## 2019-12-16 NOTE — HOSPITAL COURSE
2019   Pre-operative Diagnosis:  39w0d, previous uterine incision previous  delivery declines trial of labor, desires permanent sterilization with bilateral partial salpingectomy, history of both sons with G6PD deficiency  Post-operative Diagnosis: same, male infant  baby boy 6'15  PROCEDURE:   repeat low transverse  section with bilateral partial salpingectomy  Calculated Blood Loss:  280mL  Complications:  None    Transferred to mother baby unit for routine post partum care. Patient progressed well postoperatively without complication.  She was stable for discharge post op day 2.

## 2019-12-16 NOTE — ASSESSMENT & PLAN NOTE
2019   Pre-operative Diagnosis:  39w0d, previous uterine incision previous  delivery declines trial of labor, desires permanent sterilization with bilateral partial salpingectomy, history of both sons with G6PD deficiency  Post-operative Diagnosis: same, male infant  baby boy 6'15  PROCEDURE:   repeat low transverse  section with bilateral partial salpingectomy  Calculated Blood Loss:  280mL  Complications:  None    2019  Pod #1  S/p repeat c/section +tl  Afebrile  Anticipate flatus; continue dietary advances  Encouraged ambulation  Aware ok to shower  Anticipate stable for discharge in 1-2 days

## 2019-12-16 NOTE — ANESTHESIA PREPROCEDURE EVALUATION
12/16/2019  Nu Noel is a 28 y.o., female.    Anesthesia Evaluation    I have reviewed the Patient Summary Reports.    I have reviewed the Nursing Notes.   I have reviewed the Medications.     Review of Systems  Anesthesia Hx:  No problems with previous Anesthesia  Neg history of prior surgery. Denies Family Hx of Anesthesia complications.   Denies Personal Hx of Anesthesia complications.   Social:  Non-Smoker, No Alcohol Use    Hematology/Oncology:  Hematology Normal   Oncology Normal     EENT/Dental:EENT/Dental Normal   Cardiovascular:  Cardiovascular Normal Exercise tolerance: good  Denies Pacemaker.  Denies Hypertension.  NYHA Classification I ECG has been reviewed.    Pulmonary:  Pulmonary Normal    Renal/:  Renal/ Normal     Hepatic/GI:  Hepatic/GI Normal    Musculoskeletal:  Musculoskeletal Normal    Neurological:  Neurology Normal    Endocrine:  Endocrine Normal    Psych:  Psychiatric Normal           Physical Exam  General:  Well nourished    Airway/Jaw/Neck:  Airway Findings: Mouth Opening: Normal Tongue: Normal  Mallampati: I  Improves to II with phonation.  TM Distance: Normal, at least 6 cm     Eyes/Ears/Nose:  EYES/EARS/NOSE FINDINGS: Normal   Dental:  DENTAL FINDINGS: Normal   Chest/Lungs:  Chest/Lungs Findings: Clear to auscultation, Normal Respiratory Rate     Heart/Vascular:  Heart Findings: Rate: Normal  Rhythm: Regular Rhythm  Sounds: Normal     Abdomen:  Abdomen Findings:  Normal, Soft     Musculoskeletal:  Musculoskeletal Findings: Normal   Skin:  Skin Findings: Normal    Mental Status:  Mental Status Findings: Normal        Anesthesia Plan  Type of Anesthesia, risks & benefits discussed:  Anesthesia Type:  spinal  Patient's Preference:   Intra-op Monitoring Plan: standard ASA monitors  Intra-op Monitoring Plan Comments:   Post Op Pain Control Plan: multimodal analgesia and  intrathecal opioid  Post Op Pain Control Plan Comments:   Induction:    Beta Blocker:  Patient is not currently on a Beta-Blocker (No further documentation required).       Informed Consent: Patient understands risks and agrees with Anesthesia plan.  Questions answered. Anesthesia consent signed with patient.  ASA Score: 2     Day of Surgery Review of History & Physical: I have interviewed and examined the patient. I have reviewed the patient's H&P dated:  There are no significant changes.          Ready For Surgery From Anesthesia Perspective.

## 2019-12-16 NOTE — L&D DELIVERY NOTE
[facility]   Section   Operative Note    SUMMARY     Date of Procedure: 2019        OPERATIVE NOTE    2019     PRE-PROCEDURE COUNSELING  Patient counseled on the risks, benefits, and alternatives to procedure.  Please see preoperative consents.   SCDs were applied and working prior to anesthesia induction.    Pre-operative Diagnosis:  39w0d, previous uterine incision previous  delivery declines trial of labor, desires permanent sterilization with bilateral partial salpingectomy, history of both sons with G6PD deficiency    Post-operative Diagnosis: same, male infant     PROCEDURE:   repeat low transverse  section with bilateral partial salpingectomy    Surgeon: Nahomi Wagoner MD    Assistants: First Assistant: ELMA Bernstein    Was necessary to assist in performing this case      Anesthesia: Spinal anesthesia     Procedure Details   The patient was seen in the Holding Room. The risks, benefits, complications, treatment options, and expected outcomes were discussed with the patient incl alt to BTL and risk prenancy and ectopic pregnancy.  The patient concurred with the proposed plan, giving informed consent.  The patient was taken to Operating Room, identified as Nu Noel  and the procedure verified as  Delivery. A Time Out was held and the above information confirmed.    After induction of anesthesia, the patient was draped and prepped in the usual sterile manner. A Pfannenstiel incision was made and carried down through the subcutaneous tissue to the fascia. Fascial incision was made and extended transversely. The fascia was  from the underlying rectus tissue superiorly and inferiorly. The peritoneum was identified and entered bluntly then extended superiorly and inferiorly with good visualization of the underlying bowel and bladder.   The utero-vesical peritoneal reflection was incised transversely and the bladder flap was bluntly  freed from the lower uterine segment. A low transverse uterine incision was made. There was clear amniotic fluid noted. The baby boy 6'15 was delivered from vertex presentation, and with Apgar scores of 9/9. The umbilical cord was clamped and cut, and cord blood was obtained. The placenta was removed intact and appeared normal .  The uterine incision was reapproximated with running locked sutures of 0 Vicryl.     Next the salpingectomy was performed. The left tube was grasped with Jim clamps - followed to the fimbriated edge. A fenestration was made in between the larger vessels in the mesosalpinx and tied off with plain gut suture, as was the segment of tube at the ends of the segment. The mesosalpinx was then transected and the segment of  tube was resected.  The same was performed on the other side.     Lavage was performed and hemostasis noted.. The fascia was then reapproximated with running sutures of 0 loop PDS. The skin was reapproximated with 4-0 vicryl in a subcuticular fashion.  Aquacel bandage was placed over the incision.  Instrument, sponge, and needle counts were correct prior the abdominal closure and at the conclusion of the case.     Calculated Blood Loss:  280mL           Specimens: tubal segments           Complications:  none           Disposition: to recovery PP room           Condition: stable                         Delivery Information for Bro Noel    Birth information:  YOB: 2021   Time of birth: 8:13 AM   Sex: male   Head Delivery Date/Time: 2019  8:13 AM   Delivery type: , Low Transverse   Gestational Age: 39w0d    Delivery Providers    Delivering clinician:  Nahomi Wagoner MD   Provider Role    Fritz Cerda RN Registered Nurse    Bonita Gant RN Registered Nurse    Carlos Hoang, GENOVEVA Nurse Anesthetist    Laureen Del Angel PA-C First Assist    Mena Silva ST Surgical Tech            Measurements    Weight:  3150 g  Length:   50.2 cm  Head circumference:  36.5 cm  Chest circumference:  32 cm  Abdominal girth:  30.5 cm         Apgars    Living status:  Living  Apgars:   1 min.:   5 min.:   10 min.:   15 min.:   20 min.:     Skin color:   1  1       Heart rate:   2  2       Reflex irritability:   2  2       Muscle tone:   2  2       Respiratory effort:   2  2       Total:   9  9       Apgars assigned by:  ANNA REDMAN RN         Operative Delivery    Forceps attempted?:  No  Vacuum extractor attempted?:  No         Shoulder Dystocia    Shoulder dystocia present?:  No           Presentation    Presentation:  Vertex  Position:  Middle Occiput Anterior           Interventions/Resuscitation    Method:  Bulb Suctioning, Tactile Stimulation       Cord    Vessels:  3 vessels  Complications:  Body  Cord Around:  left lower extremity  Number of Loops:  1  Delayed Cord Clamping?:  Yes  Cord Clamped Date/Time:  2019  8:14 AM  Cord Blood Disposition:  Lab  Gases Sent?:  No  Stem Cell Collection (by MD):  No       Placenta    Placenta delivery date/time:  2019 0815  Placenta removal:  Manual removal  Placenta appearance:  Intact  Placenta disposition:  discarded           Labor Events:       labor: No     Labor Onset Date/Time:         Dilation Complete Date/Time:         Start Pushing Date/Time:       Rupture Date/Time:              Rupture type:           Fluid Amount:        Fluid Color:        Fluid Odor:        Membrane Status (PeriCalm):        Rupture Date/Time (PeriCalm):        Fluid Amount (PeriCalm):        Fluid Color (PeriCalm):         steroids: None     Antibiotics given for GBS: No     Induction: none     Indications for induction:        Augmentation:       Indications for augmentation:       Labor complications: None     Additional complications:          Cervical ripening:                     Delivery:      Episiotomy: None     Indication for Episiotomy:       Perineal Lacerations: None Repaired:       Periurethral Laceration:   Repaired:     Labial Laceration:   Repaired:     Sulcus Laceration:   Repaired:     Vaginal Laceration:   Repaired:     Cervical Laceration:   Repaired:     Repair suture: None     Repair # of packets: 0     Last Value - EBL - Nursing (mL): 0     Sum - EBL - Nursing (mL): 0     Last Value - EBL - Anesthesia (mL):      Calculated QBL (mL): 285      Vaginal Sweep Performed: No     Surgicount Correct: Yes       Other providers:       Anesthesia    Method:  Spinal          Details (if applicable):  Trial of Labor No    Categorization: Repeat    Priority: Routine   Indications for : Repeat Section   Incision Type: low transverse     Additional  information:  Forceps:    Vacuum:    Breech:    Observed anomalies    Other (Comments):

## 2019-12-16 NOTE — ANESTHESIA PROCEDURE NOTES
Spinal    Diagnosis: TIUP,Repeat C/S  Patient location during procedure: OR  Start time: 12/16/2019 7:47 AM  Timeout: 12/16/2019 7:47 AM  End time: 12/16/2019 7:54 AM    Staffing  Authorizing Provider: Maira Duke MD  Performing Provider: Carlos Hoang CRNA    Preanesthetic Checklist  Completed: patient identified, surgical consent, pre-op evaluation, timeout performed, IV checked, risks and benefits discussed and monitors and equipment checked  Spinal Block  Patient position: sitting  Prep: Betadine  Patient monitoring: heart rate, cardiac monitor, continuous pulse ox and frequent blood pressure checks  Approach: midline  Location: L3-4  Injection technique: single shot  CSF Fluid: clear free-flowing CSF  Needle  Needle type: pencil-tip   Needle gauge: 27 G  Needle length: 5 in  Additional Documentation: incremental injection, negative aspiration for heme and no paresthesia on injection  Needle localization: anatomical landmarks  Assessment  Sensory level: T6   Dermatomal levels determined by pinch or prick  Ease of block: moderate  Patient's tolerance of the procedure: comfortable throughout block and no complaints

## 2019-12-16 NOTE — TRANSFER OF CARE
"Anesthesia Transfer of Care Note    Patient: Nu Noel    Procedure(s) Performed: Procedure(s) (LRB):   SECTION, WITH TUBAL LIGATION (N/A)    Patient location: Labor and Delivery    Anesthesia Type: spinal    Post pain: adequate analgesia    Post assessment: no apparent anesthetic complications and tolerated procedure well    Post vital signs: stable    Level of consciousness: awake, alert and oriented    Nausea/Vomiting: no nausea/vomiting    Complications: none    Transfer of care protocol was followed      Last vitals:   Visit Vitals  BP (!) 97/46   Pulse 87   Ht 5' 3" (1.6 m)   Wt 88.9 kg (196 lb)   LMP 2019 (Exact Date)   SpO2 100%   Breastfeeding? No   BMI 34.72 kg/m²     "

## 2019-12-16 NOTE — SUBJECTIVE & OBJECTIVE
Obstetric HPI:        Objective:     Vital Signs (Most Recent):  Pulse: 87 (12/16/19 0700)  BP: (!) 97/46 (12/16/19 0700)  SpO2: 100 % (12/16/19 0700) Vital Signs (24h Range):  Pulse:  [87-92] 87  SpO2:  [100 %] 100 %  BP: ()/(46-75) 97/46     Weight: 88.9 kg (196 lb)  Body mass index is 34.72 kg/m².    FHT:  140 Cat 1 (reassuring)       No intake or output data in the 24 hours ending 12/16/19 0738       Significant Labs:  Recent Lab Results       12/16/19 0610        Baso # 0.02     Basophil% 0.2     Differential Method Automated     Eos # 0.1     Eosinophil% 0.7     Gran # (ANC) 8.1     Gran% 74.8     Hematocrit 38.0     Hemoglobin 12.2     Immature Grans (Abs) 0.05  Comment:  Mild elevation in immature granulocytes is non specific and   can be seen in a variety of conditions including stress response,   acute inflammation, trauma and pregnancy. Correlation with other   laboratory and clinical findings is essential.       Immature Granulocytes 0.5     Lymph # 1.8     Lymph% 16.6     MCH 30.3     MCHC 32.1     MCV 95     Mono # 0.8     Mono% 7.2     MPV 13.3     nRBC 0     Platelets 170     RBC 4.02     RDW 13.7     WBC 10.76           Physical Exam:   Constitutional: She is oriented to person, place, and time. She appears well-developed and well-nourished.        Pulmonary/Chest: Effort normal.        Abdominal: Soft. She exhibits no distension. There is no tenderness. There is no guarding.             Musculoskeletal: Normal range of motion.       Neurological: She is alert and oriented to person, place, and time. She has normal reflexes.    Skin: Skin is warm and dry.    Psychiatric: She has a normal mood and affect. Her behavior is normal. Judgment and thought content normal.

## 2019-12-16 NOTE — ANESTHESIA POSTPROCEDURE EVALUATION
Anesthesia Post Evaluation    Patient: Nu Noel    Procedure(s) Performed: Procedure(s) (LRB):   SECTION, WITH TUBAL LIGATION (N/A)    Final Anesthesia Type: spinal    Patient location during evaluation: labor & delivery  Patient participation: Yes- Able to Participate  Level of consciousness: awake and alert and oriented  Post-procedure vital signs: reviewed and stable  Pain management: adequate  Airway patency: patent  MANJINDER mitigation strategies: Use of major conduction anesthesia (spinal/epidural) or peripheral nerve block  PONV status at discharge: No PONV  Anesthetic complications: no      Cardiovascular status: hemodynamically stable  Respiratory status: spontaneous ventilation and room air  Hydration status: euvolemic  Follow-up not needed.          Vitals Value Taken Time   /67 2019  7:31 AM   Temp 96.3 2019  9:03 AM   Pulse 81 2019  7:36 AM   Resp 16 2019  9:03 AM   SpO2 100 % 2019  7:36 AM   Vitals shown include unvalidated device data.      No case tracking events are documented in the log.      Pain/Zaida Score: No data recorded

## 2019-12-17 PROBLEM — R42 DIZZINESS: Status: RESOLVED | Noted: 2019-10-07 | Resolved: 2019-12-17

## 2019-12-17 PROBLEM — O99.820 GBS (GROUP B STREPTOCOCCUS CARRIER), +RV CULTURE, CURRENTLY PREGNANT: Status: RESOLVED | Noted: 2019-12-02 | Resolved: 2019-12-17

## 2019-12-17 PROBLEM — N76.0 BV (BACTERIAL VAGINOSIS): Status: RESOLVED | Noted: 2019-12-08 | Resolved: 2019-12-17

## 2019-12-17 PROBLEM — B96.89 BV (BACTERIAL VAGINOSIS): Status: RESOLVED | Noted: 2019-12-08 | Resolved: 2019-12-17

## 2019-12-17 PROBLEM — O34.219 HISTORY OF CESAREAN DELIVERY, ANTEPARTUM: Status: RESOLVED | Noted: 2019-12-16 | Resolved: 2019-12-17

## 2019-12-17 PROBLEM — Z98.891 HISTORY OF 2 CESAREAN SECTIONS: Status: RESOLVED | Noted: 2019-08-08 | Resolved: 2019-12-17

## 2019-12-17 PROBLEM — M54.9 BACK PAIN AFFECTING PREGNANCY: Status: RESOLVED | Noted: 2019-11-25 | Resolved: 2019-12-17

## 2019-12-17 PROBLEM — N89.8 VAGINAL DISCHARGE: Status: RESOLVED | Noted: 2019-12-08 | Resolved: 2019-12-17

## 2019-12-17 PROBLEM — Z34.90 NORMAL INTRAUTERINE PREGNANCY, ANTEPARTUM: Status: RESOLVED | Noted: 2019-08-08 | Resolved: 2019-12-17

## 2019-12-17 PROBLEM — O99.891 BACK PAIN AFFECTING PREGNANCY: Status: RESOLVED | Noted: 2019-11-25 | Resolved: 2019-12-17

## 2019-12-17 PROCEDURE — 25000003 PHARM REV CODE 250: Performed by: OBSTETRICS & GYNECOLOGY

## 2019-12-17 PROCEDURE — 11000001 HC ACUTE MED/SURG PRIVATE ROOM

## 2019-12-17 RX ORDER — VALACYCLOVIR HYDROCHLORIDE 500 MG/1
500 TABLET, FILM COATED ORAL DAILY
Status: DISCONTINUED | OUTPATIENT
Start: 2019-12-17 | End: 2019-12-18 | Stop reason: HOSPADM

## 2019-12-17 RX ADMIN — HYDROCODONE BITARTRATE AND ACETAMINOPHEN 1 TABLET: 10; 325 TABLET ORAL at 01:12

## 2019-12-17 RX ADMIN — CHLORHEXIDINE GLUCONATE 0.12% ORAL RINSE 10 ML: 1.2 LIQUID ORAL at 08:12

## 2019-12-17 RX ADMIN — SENNOSIDES, DOCUSATE SODIUM 1 TABLET: 50; 8.6 TABLET, FILM COATED ORAL at 08:12

## 2019-12-17 RX ADMIN — HYDROCODONE BITARTRATE AND ACETAMINOPHEN 1 TABLET: 5; 325 TABLET ORAL at 08:12

## 2019-12-17 RX ADMIN — HYDROCODONE BITARTRATE AND ACETAMINOPHEN 1 TABLET: 10; 325 TABLET ORAL at 06:12

## 2019-12-17 RX ADMIN — VALACYCLOVIR HYDROCHLORIDE 500 MG: 500 TABLET, FILM COATED ORAL at 09:12

## 2019-12-17 RX ADMIN — HYDROCODONE BITARTRATE AND ACETAMINOPHEN 1 TABLET: 10; 325 TABLET ORAL at 10:12

## 2019-12-17 NOTE — SUBJECTIVE & OBJECTIVE
Hospital course: 2019   Pre-operative Diagnosis:  39w0d, previous uterine incision previous  delivery declines trial of labor, desires permanent sterilization with bilateral partial salpingectomy, history of both sons with G6PD deficiency  Post-operative Diagnosis: same, male infant  baby boy 6'15  PROCEDURE:   repeat low transverse  section with bilateral partial salpingectomy  Calculated Blood Loss:  280mL Complications:  none      Interval History:   Pod #1    She is doing well this morning. She reports nausea and vomiting after eating a regular diet --able to tolerate clears without nausea or vomiting. She is voiding spontaneously. She is ambulating. She has not passed flatus, and has not a BM. Vaginal bleeding is mild. She denies fever or chills. Abdominal pain is mild and controlled with oral medications. She is breastfeeding. She desires circumcision for her male baby: yes.    Objective:     Vital Signs (Most Recent):  Temp: 97.9 °F (36.6 °C) (19 0400)  Pulse: 79 (19 0400)  Resp: 18 (19 0400)  BP: (!) 94/52 (19 0400)  SpO2: 96 % (19 0400) Vital Signs (24h Range):  Temp:  [96.3 °F (35.7 °C)-97.9 °F (36.6 °C)] 97.9 °F (36.6 °C)  Pulse:  [] 79  Resp:  [16-18] 18  SpO2:  [88 %-99 %] 96 %  BP: ()/(52-88) 94/52     Weight: 88.9 kg (196 lb)  Body mass index is 34.72 kg/m².      Intake/Output Summary (Last 24 hours) at 2019 0750  Last data filed at 2019 0430  Gross per 24 hour   Intake 1000 ml   Output 2885 ml   Net -1885 ml       Significant Labs:  Lab Results   Component Value Date    GROUPTRH O POS 2019    HEPBSAG Negative 2019    STREPBCULT (A) 2019     STREPTOCOCCUS AGALACTIAE (GROUP B)  Beta-hemolytic streptococci are routinely susceptible to   penicillins,cephalosporins and carbapenems.       Recent Labs   Lab 19  0610   HGB 12.2   HCT 38.0       I have personallly reviewed all pertinent lab  results from the last 24 hours.  Recent Lab Results     None          Physical Exam:   Constitutional: She is oriented to person, place, and time. She appears well-developed.    HENT:   Head: Normocephalic.    Eyes: Pupils are equal, round, and reactive to light.    Neck: Normal range of motion.    Cardiovascular: Normal rate and regular rhythm.     Pulmonary/Chest: Effort normal and breath sounds normal.        Abdominal: Soft. Bowel sounds are normal. She exhibits no abdominal incision (aquasel dressing intact, soilage noted).     Genitourinary:   Genitourinary Comments: Ut--firm, non tender           Musculoskeletal: Normal range of motion. She exhibits no edema.       Neurological: She is alert and oriented to person, place, and time.    Skin: Skin is warm and dry.    Psychiatric: She has a normal mood and affect. Her behavior is normal. Judgment and thought content normal.

## 2019-12-17 NOTE — PLAN OF CARE
Pt is progressing well. Pain is controlled with PO pain meds. Ambulates independently and voids without difficulty. Dressing remains dry and intact with moderate amount of drainage noted. Formula feeding. Bonding well with baby. VSS. Will continue to monitor.     weight-bearing as tolerated

## 2019-12-17 NOTE — PROGRESS NOTES
[facility] -   Obstetrics  Postpartum Progress Note    Patient Name: Nu Noel  MRN: [id number]  Admission Date: 2019  Hospital Length of Stay: 1 days  Attending Physician: Nahomi Wagoner MD  Primary Care Provider: Esther Membreno NP    Subjective:     Principal Problem:S/P  section    Hospital course: 2019   Pre-operative Diagnosis:  39w0d, previous uterine incision previous  delivery declines trial of labor, desires permanent sterilization with bilateral partial salpingectomy, history of both sons with G6PD deficiency  Post-operative Diagnosis: same, male infant  baby boy 6'15  PROCEDURE:   repeat low transverse  section with bilateral partial salpingectomy  Calculated Blood Loss:  280mL Complications:  none      Interval History:   Pod #1    She is doing well this morning. She reports nausea and vomiting after eating a regular diet --able to tolerate clears without nausea or vomiting. She is voiding spontaneously. She is ambulating. She has not passed flatus, and has not a BM. Vaginal bleeding is mild. She denies fever or chills. Abdominal pain is mild and controlled with oral medications. She is breastfeeding. She desires circumcision for her male baby: yes.    Objective:     Vital Signs (Most Recent):  Temp: 97.9 °F (36.6 °C) (19 0400)  Pulse: 79 (19 0400)  Resp: 18 (19 0400)  BP: (!) 94/52 (19 0400)  SpO2: 96 % (19 0400) Vital Signs (24h Range):  Temp:  [96.3 °F (35.7 °C)-97.9 °F (36.6 °C)] 97.9 °F (36.6 °C)  Pulse:  [] 79  Resp:  [16-18] 18  SpO2:  [88 %-99 %] 96 %  BP: ()/(52-88) 94/52     Weight: 88.9 kg (196 lb)  Body mass index is 34.72 kg/m².      Intake/Output Summary (Last 24 hours) at 2019 0750  Last data filed at 2019 0430  Gross per 24 hour   Intake 1000 ml   Output 2885 ml   Net -1885 ml       Significant Labs:  Lab Results   Component Value Date    GROUPTRH O POS  2019    HEPBSAG Negative 2019    STREPBCULT (A) 2019     STREPTOCOCCUS AGALACTIAE (GROUP B)  Beta-hemolytic streptococci are routinely susceptible to   penicillins,cephalosporins and carbapenems.       Recent Labs   Lab 19  0610   HGB 12.2   HCT 38.0       I have personallly reviewed all pertinent lab results from the last 24 hours.  Recent Lab Results     None          Physical Exam:   Constitutional: She is oriented to person, place, and time. She appears well-developed.    HENT:   Head: Normocephalic.    Eyes: Pupils are equal, round, and reactive to light.    Neck: Normal range of motion.    Cardiovascular: Normal rate and regular rhythm.     Pulmonary/Chest: Effort normal and breath sounds normal.        Abdominal: Soft. Bowel sounds are normal. She exhibits no abdominal incision (aquasel dressing intact, soilage noted).     Genitourinary:   Genitourinary Comments: Ut--firm, non tender           Musculoskeletal: Normal range of motion. She exhibits no edema.       Neurological: She is alert and oriented to person, place, and time.    Skin: Skin is warm and dry.    Psychiatric: She has a normal mood and affect. Her behavior is normal. Judgment and thought content normal.       Assessment/Plan:     28 y.o. female  for:    * S/P  section  2019   Pre-operative Diagnosis:  39w0d, previous uterine incision previous  delivery declines trial of labor, desires permanent sterilization with bilateral partial salpingectomy, history of both sons with G6PD deficiency  Post-operative Diagnosis: same, male infant  baby boy 6'15  PROCEDURE:   repeat low transverse  section with bilateral partial salpingectomy  Calculated Blood Loss:  280mL  Complications:  None    2019  Pod #1  S/p repeat c/section +tl  Afebrile  Anticipate flatus; continue dietary advances  Encouraged ambulation  Aware ok to shower  Anticipate stable for discharge in 1-2 days    HSV (herpes  simplex virus) anogenital infection   Taking suppressive acyclovir. No outbreaks this pregnancy.  Continue during post partum    Family history of G6PD - both sons have it (x linked rec)   Delivery staff notified.    Sickle cell trait           Disposition: As patient meets milestones, will plan to discharge in 1-2 days.    Katja Peng MD  Obstetrics  [facility]

## 2019-12-18 VITALS
BODY MASS INDEX: 34.73 KG/M2 | TEMPERATURE: 98 F | WEIGHT: 196 LBS | HEART RATE: 98 BPM | OXYGEN SATURATION: 96 % | SYSTOLIC BLOOD PRESSURE: 112 MMHG | DIASTOLIC BLOOD PRESSURE: 62 MMHG | RESPIRATION RATE: 18 BRPM | HEIGHT: 63 IN

## 2019-12-18 PROCEDURE — 99238 HOSP IP/OBS DSCHRG MGMT 30/<: CPT | Mod: ,,, | Performed by: OBSTETRICS & GYNECOLOGY

## 2019-12-18 PROCEDURE — 25000003 PHARM REV CODE 250: Performed by: OBSTETRICS & GYNECOLOGY

## 2019-12-18 PROCEDURE — 99238 PR HOSPITAL DISCHARGE DAY,<30 MIN: ICD-10-PCS | Mod: ,,, | Performed by: OBSTETRICS & GYNECOLOGY

## 2019-12-18 RX ORDER — SIMETHICONE 80 MG
80 TABLET,CHEWABLE ORAL EVERY 6 HOURS PRN
Qty: 30 TABLET | Refills: 0 | Status: SHIPPED | OUTPATIENT
Start: 2019-12-18

## 2019-12-18 RX ORDER — DOCUSATE SODIUM 100 MG/1
200 CAPSULE, LIQUID FILLED ORAL 2 TIMES DAILY PRN
Qty: 30 CAPSULE | Refills: 0 | Status: SHIPPED | OUTPATIENT
Start: 2019-12-18

## 2019-12-18 RX ORDER — HYDROCODONE BITARTRATE AND ACETAMINOPHEN 5; 325 MG/1; MG/1
1 TABLET ORAL EVERY 6 HOURS PRN
Qty: 30 TABLET | Refills: 0 | Status: SHIPPED | OUTPATIENT
Start: 2019-12-18

## 2019-12-18 RX ADMIN — VALACYCLOVIR HYDROCHLORIDE 500 MG: 500 TABLET, FILM COATED ORAL at 08:12

## 2019-12-18 RX ADMIN — CHLORHEXIDINE GLUCONATE 0.12% ORAL RINSE 10 ML: 1.2 LIQUID ORAL at 08:12

## 2019-12-18 RX ADMIN — SIMETHICONE CHEW TAB 80 MG 80 MG: 80 TABLET ORAL at 06:12

## 2019-12-18 RX ADMIN — HYDROCODONE BITARTRATE AND ACETAMINOPHEN 1 TABLET: 10; 325 TABLET ORAL at 05:12

## 2019-12-18 NOTE — SUBJECTIVE & OBJECTIVE
Hospital course: 2019   Pre-operative Diagnosis:  39w0d, previous uterine incision previous  delivery declines trial of labor, desires permanent sterilization with bilateral partial salpingectomy, history of both sons with G6PD deficiency  Post-operative Diagnosis: same, male infant  baby boy 6'15  PROCEDURE:   repeat low transverse  section with bilateral partial salpingectomy  Calculated Blood Loss:  280mL  Complications:  None    Transferred to mother baby unit for routine post partum care. Patient progressed well postoperatively without complication.  She was stable for discharge post op day 2.      Interval History:     She is doing well this morning. She is tolerating a regular diet without nausea or vomiting. She is voiding spontaneously. She is ambulating. She has not passed flatus, and has not a BM. Vaginal bleeding is mild. She denies fever or chills. Abdominal pain is mild and controlled with oral medications. She is not breastfeeding. She desires circumcision for her male baby: yes.    Objective:     Vital Signs (Most Recent):  Temp: 98.7 °F (37.1 °C) (19 0400)  Pulse: 78 (19 0400)  Resp: 20 (19 0400)  BP: 106/63 (19 0400)  SpO2: 96 % (19 0400) Vital Signs (24h Range):  Temp:  [98.3 °F (36.8 °C)-98.7 °F (37.1 °C)] 98.7 °F (37.1 °C)  Pulse:  [77-95] 78  Resp:  [18-20] 20  BP: ()/(54-63) 106/63     Weight: 88.9 kg (196 lb)  Body mass index is 34.72 kg/m².      Intake/Output Summary (Last 24 hours) at 2019 0748  Last data filed at 2019 0800  Gross per 24 hour   Intake --   Output 600 ml   Net -600 ml       Significant Labs:  Lab Results   Component Value Date    GROUPTRH O POS 2019    HEPBSAG Negative 2019    STREPBCULT (A) 2019     STREPTOCOCCUS AGALACTIAE (GROUP B)  Beta-hemolytic streptococci are routinely susceptible to   penicillins,cephalosporins and carbapenems.       No results for input(s):  HGB, HCT in the last 48 hours.    I have personallly reviewed all pertinent lab results from the last 24 hours.    Physical Exam:   Constitutional: She is oriented to person, place, and time. She appears well-developed and well-nourished. No distress.       Cardiovascular: Normal rate, regular rhythm, normal heart sounds and intact distal pulses.    No murmur heard.   Pulmonary/Chest: Effort normal and breath sounds normal. No respiratory distress. She has no wheezes. She has no rales.        Abdominal: Soft. Bowel sounds are normal. She exhibits abdominal incision (Aquacel dressing in place, clear/dry/intact). She exhibits no distension. There is no tenderness. There is no guarding.   Uterine fundus firm, below umbilicus, mild tenderness (appropriate)             Musculoskeletal: Moves all extremeties. She exhibits no edema.   No calf tenderness       Neurological: She is alert and oriented to person, place, and time.    Skin: Skin is warm and dry. No rash noted. She is not diaphoretic.

## 2019-12-18 NOTE — DISCHARGE INSTRUCTIONS

## 2019-12-18 NOTE — PROGRESS NOTES
[facility] -   Obstetrics  Postpartum Progress Note    Patient Name: Nu Noel  MRN: [id number]  Admission Date: 2019  Hospital Length of Stay: 2 days  Attending Physician: Nahomi Wagoner MD  Primary Care Provider: Esther Membreno NP    Subjective:     Principal Problem:S/P  section    Hospital course: 2019   Pre-operative Diagnosis:  39w0d, previous uterine incision previous  delivery declines trial of labor, desires permanent sterilization with bilateral partial salpingectomy, history of both sons with G6PD deficiency  Post-operative Diagnosis: same, male infant  baby boy 6'15  PROCEDURE:   repeat low transverse  section with bilateral partial salpingectomy  Calculated Blood Loss:  280mL  Complications:  None    Transferred to mother baby unit for routine post partum care. Patient progressed well postoperatively without complication.  She was stable for discharge post op day 2.      Interval History:     She is doing well this morning. She is tolerating a regular diet without nausea or vomiting. She is voiding spontaneously. She is ambulating. She has not passed flatus, and has not a BM. Vaginal bleeding is mild. She denies fever or chills. Abdominal pain is mild and controlled with oral medications. She is not breastfeeding. She desires circumcision for her male baby: yes.    Objective:     Vital Signs (Most Recent):  Temp: 98.7 °F (37.1 °C) (19 0400)  Pulse: 78 (19 0400)  Resp: 20 (19 0400)  BP: 106/63 (19 0400)  SpO2: 96 % (19 0400) Vital Signs (24h Range):  Temp:  [98.3 °F (36.8 °C)-98.7 °F (37.1 °C)] 98.7 °F (37.1 °C)  Pulse:  [77-95] 78  Resp:  [18-20] 20  BP: ()/(54-63) 106/63     Weight: 88.9 kg (196 lb)  Body mass index is 34.72 kg/m².      Intake/Output Summary (Last 24 hours) at 2019 0748  Last data filed at 2019 0800  Gross per 24 hour   Intake --   Output 600 ml   Net -600 ml        Significant Labs:  Lab Results   Component Value Date    GROUPTRH O POS 2019    HEPBSAG Negative 2019    STREPBCULT (A) 2019     STREPTOCOCCUS AGALACTIAE (GROUP B)  Beta-hemolytic streptococci are routinely susceptible to   penicillins,cephalosporins and carbapenems.       No results for input(s): HGB, HCT in the last 48 hours.    I have personallly reviewed all pertinent lab results from the last 24 hours.    Physical Exam:   Constitutional: She is oriented to person, place, and time. She appears well-developed and well-nourished. No distress.       Cardiovascular: Normal rate, regular rhythm, normal heart sounds and intact distal pulses.    No murmur heard.   Pulmonary/Chest: Effort normal and breath sounds normal. No respiratory distress. She has no wheezes. She has no rales.        Abdominal: Soft. Bowel sounds are normal. She exhibits abdominal incision (Aquacel dressing in place, clear/dry/intact). She exhibits no distension. There is no tenderness. There is no guarding.   Uterine fundus firm, below umbilicus, mild tenderness (appropriate)             Musculoskeletal: Moves all extremeties. She exhibits no edema.   No calf tenderness       Neurological: She is alert and oriented to person, place, and time.    Skin: Skin is warm and dry. No rash noted. She is not diaphoretic.        Assessment/Plan:     28 y.o. female  for:    * S/P  section  2019   Pre-operative Diagnosis:  39w0d, previous uterine incision previous  delivery declines trial of labor, desires permanent sterilization with bilateral partial salpingectomy, history of both sons with G6PD deficiency  Post-operative Diagnosis: same, male infant  baby boy 6'15  PROCEDURE:   repeat low transverse  section with bilateral partial salpingectomy  Calculated Blood Loss:  280mL  Complications:  None    2019  Pod #2  S/p repeat c/section +tl  Afebrile  She is tolerating a regular diet with  resolution of nausea/vomiting  Encouraged ambulation  Aware ok to shower  Stable for discharge later today    HSV (herpes simplex virus) anogenital infection   Taking suppressive acyclovir. No outbreaks this pregnancy.  Continue during post partum    Family history of G6PD - both sons have it (x linked rec)   Delivery staff notified.    Sickle cell trait           Disposition: As patient meets milestones, will plan to discharge today.    Laureen Del Angel PA-C  Obstetrics  [facility]

## 2019-12-18 NOTE — ASSESSMENT & PLAN NOTE
2019   Pre-operative Diagnosis:  39w0d, previous uterine incision previous  delivery declines trial of labor, desires permanent sterilization with bilateral partial salpingectomy, history of both sons with G6PD deficiency  Post-operative Diagnosis: same, male infant  baby boy 6'15  PROCEDURE:   repeat low transverse  section with bilateral partial salpingectomy  Calculated Blood Loss:  280mL  Complications:  None    2019  Pod #2  S/p repeat c/section +tl  Afebrile  She is tolerating a regular diet with resolution of nausea/vomiting  Encouraged ambulation  Aware ok to shower  Stable for discharge later today

## 2019-12-18 NOTE — PLAN OF CARE
VSS. Patient is has been ambulating and up to bathroom without difficulty. Pain is controlled with oral pain medications. Bottlefeeding. Bonding well with infant

## 2019-12-18 NOTE — PLAN OF CARE
Pt progressing well. Vss. Bonding well with infant. Aquacel dressing changed this am with PA, no drainage noted since change. Bleeding light, fundus firm without massage, pain controlled with po meds. Ambulated in byrd, concerned about not having a BM, explained that its normal.

## 2019-12-18 NOTE — NURSING
Discharge ordered recieved and reviewed. All follow up have been discussed and questions have been answered. Mom wheeled out in wheelchair with baby in arms.

## 2019-12-18 NOTE — DISCHARGE SUMMARY
[facility] -   Obstetrics  Discharge Summary      Patient Name: Nu Noel  MRN: [id number]  Admission Date: 2019  Hospital Length of Stay: 2 days  Discharge Date and Time:  2019 7:57 AM  Attending Physician: Nahomi Wagoner MD   Discharging Provider: Laureen Del Angel PA-C   Primary Care Provider: Esther Membreno NP    HPI: Nu Noel  39w0d with history 2  deliveries, desires repeat . She also has a history of genital herpes, no outbreaks this pregnancy and she is taking her acyclovir suppression, sickle cell trait condition, family history significant for her sons both with G6PD.        Procedure(s) (LRB):   SECTION, WITH TUBAL LIGATION (N/A)     Hospital Course:   2019   Pre-operative Diagnosis:  39w0d, previous uterine incision previous  delivery declines trial of labor, desires permanent sterilization with bilateral partial salpingectomy, history of both sons with G6PD deficiency  Post-operative Diagnosis: same, male infant  baby boy 6'15  PROCEDURE:   repeat low transverse  section with bilateral partial salpingectomy  Calculated Blood Loss:  280mL  Complications:  None    Transferred to mother baby unit for routine post partum care. Patient progressed well postoperatively without complication.  She was stable for discharge post op day 2.           Final Active Diagnoses:    Diagnosis Date Noted POA    PRINCIPAL PROBLEM:  S/P  section [Z98.891] 2019 Not Applicable    HSV (herpes simplex virus) anogenital infection [A60.9] 2019 Yes    Family history of G6PD - both sons have it (x linked rec) [Z83.49] 2019 Not Applicable    Sickle cell trait [D57.3] 2019 Yes      Problems Resolved During this Admission:    Diagnosis Date Noted Date Resolved POA    History of  delivery, antepartum [O34.219] 2019 Yes        Labs: All labs within the past 24  hours have been reviewed    Feeding Method: bottle    Immunizations     Date Immunization Status Dose Route/Site Given by    19 1017 MMR Incomplete 0.5 mL Subcutaneous/Left deltoid     19 1017 Tdap Incomplete 0.5 mL Intramuscular/Left deltoid           Delivery:    Episiotomy: None   Lacerations: None   Repair suture: None   Repair # of packets: 0   Blood loss (ml): 0     Birth information:  YOB: 2021   Time of birth: 8:13 AM   Sex: male   Delivery type: , Low Transverse   Gestational Age: 39w0d    Delivery Clinician:      Other providers:       Additional  information:  Forceps:    Vacuum:    Breech:    Observed anomalies      Living?:           APGARS  One minute Five minutes Ten minutes   Skin color:         Heart rate:         Grimace:         Muscle tone:         Breathing:         Totals: 9  9        Placenta: Delivered:       appearance    Pending Diagnostic Studies:     Procedure Component Value Units Date/Time    Specimen to Pathology, Surgery Gynecology and Obstetrics [[id number]] Collected:  19    Order Status:  Sent Lab Status:  In process Updated:  19 1043          Discharged Condition: good    Disposition: Home or Self Care    Follow Up:  Follow-up Information     Nahomi Wagoner MD In 4 weeks.    Specialties:  Gynecology, Obstetrics and Gynecology, Obstetrics  Why:  Post partum  Contact information:  [id number]  [place] [address] [phone]             [facility] In 1 week.    Why:  For dressing removal               Patient Instructions:      Diet Adult Regular     Other restrictions (specify):   Order Comments: Pelvic rest x 6 weeks (no tampons, intercourse douching); showers only for 6 wks; no lifting more than baby     Call MD for:  temperature >100.4     Call MD for:  severe uncontrolled pain     Call MD for:  redness, tenderness, or signs of infection (pain, swelling, redness, odor or green/yellow discharge around  incision site)     Call MD for:  difficulty breathing or increased cough     Call MD for:  severe persistent headache     Call MD for:  persistent dizziness, light-headedness, or visual disturbances     Leave dressing on - Keep it clean, dry, and intact until clinic visit   Order Comments: Dressing will be removed at 1 week nurse visit.  Showers only- no baths for 6 weeks.     Medications:  Current Discharge Medication List      START taking these medications    Details   docusate sodium (COLACE) 100 MG capsule Take 2 capsules (200 mg total) by mouth 2 (two) times daily as needed (constipation).  Qty: 30 capsule, Refills: 0      HYDROcodone-acetaminophen (NORCO) 5-325 mg per tablet Take 1 tablet by mouth every 6 (six) hours as needed for Pain.  Qty: 30 tablet, Refills: 0    Comments: Quantity prescribed more than 7 day supply? No      simethicone (MYLICON) 80 MG chewable tablet Take 1 tablet (80 mg total) by mouth every 6 (six) hours as needed for Flatulence (gas pain).  Qty: 30 tablet, Refills: 0         CONTINUE these medications which have NOT CHANGED    Details   prenatal 25/iron fum/folic/dha (PRENATAL-1 ORAL) Take 1 tablet by mouth once daily.      valACYclovir (VALTREX) 500 MG tablet Take 500 mg by mouth once daily.         STOP taking these medications       calcium citrate/vitamin D3 (CALCIUM CITRATE + D ORAL) Comments:   Reason for Stopping:         metroNIDAZOLE (FLAGYL) 500 MG tablet Comments:   Reason for Stopping:         ondansetron (ZOFRAN-ODT) 4 MG TbDL Comments:   Reason for Stopping:               Laureen Del Angel PA-C  Obstetrics  [facility]

## 2019-12-19 ENCOUNTER — HOSPITAL ENCOUNTER (EMERGENCY)
Facility: HOSPITAL | Age: 29
Discharge: HOME | End: 2019-12-19
Attending: EMERGENCY MEDICINE
Payer: COMMERCIAL

## 2019-12-19 VITALS
WEIGHT: 189.81 LBS | BODY MASS INDEX: 33.62 KG/M2 | TEMPERATURE: 98 F | OXYGEN SATURATION: 99 % | SYSTOLIC BLOOD PRESSURE: 118 MMHG | HEART RATE: 92 BPM | DIASTOLIC BLOOD PRESSURE: 72 MMHG | RESPIRATION RATE: 18 BRPM

## 2019-12-19 DIAGNOSIS — M79.662 PAIN OF LEFT CALF: Primary | ICD-10-CM

## 2019-12-19 PROCEDURE — 99284 EMERGENCY DEPT VISIT MOD MDM: CPT | Mod: 25

## 2019-12-20 NOTE — ED PROVIDER NOTES
SCRIBE #1 NOTE: I, Atiya Bolton/Taj Lucreo, am scribing for, and in the presence of, Radha Potter MD. I have scribed the entire note.       History     Chief Complaint   Patient presents with    Leg Pain     left calf pain starting today;  on monday.     Review of patient's allergies indicates:   Allergen Reactions    Aspirin Rash    Ibuprofen Itching     Itching    Penicillins Rash         History of Present Illness     HPI    2019, 11:13 PM  History obtained from the pt.      History of Present Illness: Nu Noel is a 28 y.o. female patient with who presents to the Emergency Department for evaluation of left calf pain which onset earlier today. Pt underwent a  section three days ago. Symptoms are constant and moderate in severity. Sxs are exacerbated with movement. No associated sxs reported. Patient denies any SOB, CP, fever, chills, leg swelling, and all other sxs at this time. Prior Tx includes Norco, which did not resolve the symptoms. No further complaints or concerns at this time.       Arrival mode: Personal transportation     PCP: Esther Membreno NP      Past Medical History:  Past Medical History:   Diagnosis Date    Back pain affecting pregnancy 2019    BV (bacterial vaginosis) 2019    Dizziness 10/7/2019    Evaluate anatomy not seen on prior sonogram 2019 FU sub op kidneys and sacrum 10/14/19 Views complete and normal    GBS (group B Streptococcus carrier), +RV culture, currently pregnant 2019-GBS positive, penicillin sensitive -labor prophylaxis per protocol    Herpes simplex virus (HSV) infection     genital    History of 2  sections 2019    Repeat C/S with BTL 19 at  Noon with Dr Wagoner    History of  delivery, antepartum 2019    Normal intrauterine pregnancy, antepartum 2019    Routine PN care    Ovarian cyst        Past Surgical History:  Past Surgical History:    Procedure Laterality Date     SECTION      x2     SECTION WITH TUBAL LIGATION N/A 2019    Procedure:  SECTION, WITH TUBAL LIGATION;  Surgeon: Nahomi Wagoner MD;  [facility];  Service: OB/GYN;  Laterality: N/A;         Family History:  Family History   Problem Relation Age of Onset    No Known Problems Paternal Grandfather     No Known Problems Paternal Grandmother     Diabetes Maternal Grandmother     Hypertension Maternal Grandmother     Pancreatic cancer Maternal Grandmother     No Known Problems Father     Diabetes Mother     Hypertension Mother     Heart disease Mother         enlarged heart     No Known Problems Brother     No Known Problems Sister        Social History:   Social History     Tobacco Use    Smoking status: Never Smoker    Smokeless tobacco: Never Used   Substance and Sexual Activity    Alcohol use: No    Drug use: No    Sexual activity: Yes     Partners: Male     Birth control/protection: None        Review of Systems     Review of Systems   Constitutional: Negative for chills and fever.   HENT: Negative for sore throat.    Respiratory: Negative for shortness of breath.    Cardiovascular: Negative for chest pain and leg swelling.   Gastrointestinal: Negative for nausea.   Genitourinary: Negative for dysuria.   Musculoskeletal: Positive for myalgias. Negative for back pain.   Skin: Negative for rash.   Neurological: Negative for weakness.   Hematological: Does not bruise/bleed easily.   All other systems reviewed and are negative.       Physical Exam     Initial Vitals [19 2242]   BP Pulse Resp Temp SpO2   112/70 90 18 98.6 °F (37 °C) 97 %      MAP       --          Physical Exam  Nursing Notes and Vital Signs Reviewed.  Constitutional: Well-developed and well-nourished. NAD.  Head: Atraumatic. Normocephalic.  Eyes: PERRL. EOM intact. Conjunctivae are not pale. No scleral icterus.  ENT: Mucous membranes are moist. Oropharynx is  clear and symmetric.    Neck: Supple. Full ROM. No lymphadenopathy.  Cardiovascular: Regular rate. Regular rhythm. No murmurs, rubs, or gallops. Distal pulses are 2+ and symmetric.  Pulmonary/Chest: No respiratory distress. Clear to auscultation bilaterally. No wheezing or rales.  Abdominal: Soft and non-distended.  There is lower abd tenderness secondary to  incision.  No rebound, guarding, or rigidity. Good bowel sounds.  Genitourinary: No CVA tenderness  Musculoskeletal: Moves all extremities. No obvious deformities. No calf tenderness.  Skin: Warm and dry.  Neurological:  Alert, awake, and appropriate.  Normal speech.  No acute focal neurological deficits are appreciated.  Psychiatric: Normal affect. Good eye contact. Appropriate in content.  LLE: No calf TTP. No evident deformity. Negative for swelling. Negative for tenderness. ROM is normal. Cap refill distally is <2 seconds. DP and PT pulses are equal and 2+ bilaterally. No motor deficit. No distal sensory deficit     ED Course   Procedures  ED Vital Signs:  Vitals:    19 2242 19 2329   BP: 112/70 118/72   Pulse: 90 92   Resp: 18 18   Temp: 98.6 °F (37 °C) 98.2 °F (36.8 °C)   TempSrc: Oral Oral   SpO2: 97% 99%   Weight: 86.1 kg (189 lb 13.1 oz)          Imaging Results          US Lower Extremity Veins Left (Final result)  Result time 19 23:15:06    Final result by Anirudh Kwok MD (19 23:15:06)                 Impression:      No evidence of deep venous thrombosis in the left lower extremity.      Electronically signed by: Anirudh Kwok  Date:    2019  Time:    23:15             Narrative:    EXAMINATION:  US LOWER EXTREMITY VEINS LEFT    CLINICAL HISTORY:  Pain in left lower leg    TECHNIQUE:  Duplex and color flow Doppler and spectral waveform analysis evaluation and graded compression of the left lower extremity veins was performed.    COMPARISON:  None    FINDINGS:  Left thigh veins: The common femoral, femoral,  popliteal, upper greater saphenous, and deep femoral veins are patent and free of thrombus. The veins are normally compressible and have normal phasic flow and augmentation response.    Left calf veins: The visualized calf veins are patent.    Miscellaneous: None                                     The Emergency Provider reviewed the vital signs and test results, which are outlined above.     ED Discussion       11:42 PM: Reassessed pt at this time.  Pt states her condition has improved at this time. Discussed with pt all pertinent ED information and results. Discussed pt dx and plan of tx. Gave pt all f/u and return to the ED instructions. All questions and concerns were addressed at this time. Pt expresses understanding of information and instructions, and is comfortable with plan to discharge. Pt is stable for discharge.    I discussed with patient and/or family/caretaker that evaluation in the ED does not suggest any emergent or life threatening medical conditions requiring immediate intervention beyond what was provided in the ED, and I believe patient is safe for discharge.  Regardless, an unremarkable evaluation in the ED does not preclude the development or presence of a serious of life threatening condition. As such, patient was instructed to return immediately for any worsening or change in current symptoms.     MDM        Medical Decision Making:   Clinical Tests:   Radiological Study: Ordered and Reviewed           ED Medication(s):  Medications - No data to display    Discharge Medication List as of 12/19/2019 11:23 PM           Medication List      ASK your doctor about these medications    docusate sodium 100 MG capsule  Commonly known as:  COLACE  Take 2 capsules (200 mg total) by mouth 2 (two) times daily as needed (constipation).     HYDROcodone-acetaminophen 5-325 mg per tablet  Commonly known as:  NORCO  Take 1 tablet by mouth every 6 (six) hours as needed for Pain.     PRENATAL-1 ORAL      simethicone 80 MG chewable tablet  Commonly known as:  MYLICON  Take 1 tablet (80 mg total) by mouth every 6 (six) hours as needed for Flatulence (gas pain).     valACYclovir 500 MG tablet  Commonly known as:  VALTREX              Follow-up Information     Esther Membreno NP In 4 days.    Specialty:  Family Medicine  Contact information:  [address] [facility] [address]  [phone]             [facility] - .    Specialty:  Emergency Medicine  Why:  As needed, If symptoms worsen  Contact information:  [address]  [facility] [address] [phone]                     Scribe Attestation:   Scribe #1: I performed the above scribed service and the documentation accurately describes the services I performed. I attest to the accuracy of the note.     Attending:   Physician Attestation Statement for Scribe #1: I, Radha Potter MD, personally performed the services described in this documentation, as scribed by Atiya Bolton/Taj Lucero, in my presence, and it is both accurate and complete.           Clinical Impression       ICD-10-CM ICD-9-CM   1. Pain of left calf M79.662 729.5       Disposition:   Disposition: Discharged  Condition: Stable       Radha Potter MD  12/20/19 0578

## 2019-12-23 ENCOUNTER — TELEPHONE (OUTPATIENT)
Dept: OBSTETRICS AND GYNECOLOGY | Facility: HOSPITAL | Age: 29
End: 2019-12-23

## 2019-12-26 LAB
FINAL PATHOLOGIC DIAGNOSIS: NORMAL
GROSS: NORMAL

## 2021-10-01 PROBLEM — Z04.89 ENCOUNTER FOR EXAMINATION AND OBSERVATION FOR OTHER SPECIFIED REASONS: Status: RESOLVED | Noted: 2019-09-26 | Resolved: 2019-12-17

## 2023-04-20 NOTE — TELEPHONE ENCOUNTER
----- Message from Zoraida Ndiaye sent at 10/7/2019  8:43 AM CDT -----  .Type:  Needs Medical Advice    Who Called: self  Symptoms (please be specific): dizziness, blurred vision, vomiting  How long has patient had these symptoms:  2 days  Pharmacy name and phone #:  .  [facility] - [place] - [address]  [address]  [place] [address]  Phone: [phone] Fax: [phone]    Would the patient rather a call back or a response via [facility]? call  Best Call Back Number: .[phone] (home)   Additional Information: patient is 7 mths   Pathology finalized, please advise.

## (undated) DEVICE — DRESSING AQUACEL AG 3.5X10IN